# Patient Record
Sex: FEMALE | Race: WHITE | NOT HISPANIC OR LATINO | Employment: OTHER | URBAN - METROPOLITAN AREA
[De-identification: names, ages, dates, MRNs, and addresses within clinical notes are randomized per-mention and may not be internally consistent; named-entity substitution may affect disease eponyms.]

---

## 2019-06-16 ENCOUNTER — APPOINTMENT (EMERGENCY)
Dept: RADIOLOGY | Facility: HOSPITAL | Age: 69
End: 2019-06-16
Payer: COMMERCIAL

## 2019-06-16 ENCOUNTER — HOSPITAL ENCOUNTER (EMERGENCY)
Facility: HOSPITAL | Age: 69
Discharge: HOME | End: 2019-06-16
Attending: EMERGENCY MEDICINE | Admitting: EMERGENCY MEDICINE
Payer: COMMERCIAL

## 2019-06-16 VITALS
WEIGHT: 151.68 LBS | HEART RATE: 60 BPM | SYSTOLIC BLOOD PRESSURE: 138 MMHG | DIASTOLIC BLOOD PRESSURE: 65 MMHG | RESPIRATION RATE: 14 BRPM | BODY MASS INDEX: 26.87 KG/M2 | TEMPERATURE: 98 F | OXYGEN SATURATION: 97 %

## 2019-06-16 DIAGNOSIS — S62.109A WRIST FRACTURE: Primary | ICD-10-CM

## 2019-06-16 PROCEDURE — 99284 EMERGENCY DEPT VISIT MOD MDM: CPT

## 2019-06-16 PROCEDURE — 96374 THER/PROPH/DIAG INJ IV PUSH: CPT

## 2019-06-16 PROCEDURE — 99284 EMERGENCY DEPT VISIT MOD MDM: CPT | Performed by: EMERGENCY MEDICINE

## 2019-06-16 PROCEDURE — 73100 X-RAY EXAM OF WRIST: CPT

## 2019-06-16 PROCEDURE — 73110 X-RAY EXAM OF WRIST: CPT

## 2019-06-16 PROCEDURE — 73090 X-RAY EXAM OF FOREARM: CPT

## 2019-06-16 PROCEDURE — 96375 TX/PRO/DX INJ NEW DRUG ADDON: CPT

## 2019-06-16 RX ORDER — LATANOPROST 50 UG/ML
1 SOLUTION/ DROPS OPHTHALMIC
COMMUNITY

## 2019-06-16 RX ORDER — MORPHINE SULFATE 4 MG/ML
4 INJECTION, SOLUTION INTRAMUSCULAR; INTRAVENOUS ONCE
Status: COMPLETED | OUTPATIENT
Start: 2019-06-16 | End: 2019-06-16

## 2019-06-16 RX ORDER — FENTANYL CITRATE 50 UG/ML
50 INJECTION, SOLUTION INTRAMUSCULAR; INTRAVENOUS ONCE
Status: COMPLETED | OUTPATIENT
Start: 2019-06-16 | End: 2019-06-16

## 2019-06-16 RX ORDER — OLMESARTAN MEDOXOMIL 20 MG/1
1 TABLET ORAL DAILY
COMMUNITY
Start: 2013-07-05 | End: 2019-06-26

## 2019-06-16 RX ORDER — OXYCODONE HYDROCHLORIDE AND ACETAMINOPHEN 5; 325 MG/1; MG/1
1 TABLET ORAL EVERY 4 HOURS PRN
Qty: 13 TABLET | Refills: 0 | Status: SHIPPED | OUTPATIENT
Start: 2019-06-16 | End: 2019-06-16 | Stop reason: SDUPTHER

## 2019-06-16 RX ORDER — OXYCODONE HYDROCHLORIDE AND ACETAMINOPHEN 5; 325 MG/1; MG/1
1 TABLET ORAL EVERY 4 HOURS PRN
Qty: 13 TABLET | Refills: 0 | Status: SHIPPED | OUTPATIENT
Start: 2019-06-16 | End: 2019-06-27 | Stop reason: HOSPADM

## 2019-06-16 RX ORDER — PROPOFOL 10 MG/ML
1.5 INJECTION, EMULSION INTRAVENOUS ONCE
Status: COMPLETED | OUTPATIENT
Start: 2019-06-16 | End: 2019-06-16

## 2019-06-16 RX ORDER — BRIMONIDINE TARTRATE, TIMOLOL MALEATE 2; 5 MG/ML; MG/ML
1 SOLUTION/ DROPS OPHTHALMIC EVERY 12 HOURS SCHEDULED
COMMUNITY

## 2019-06-16 RX ORDER — CYCLOPENTOLATE HYDROCHLORIDE 5 MG/ML
1 SOLUTION/ DROPS OPHTHALMIC 2 TIMES DAILY
COMMUNITY

## 2019-06-16 RX ORDER — ONDANSETRON 2 MG/ML
4 INJECTION INTRAMUSCULAR; INTRAVENOUS ONCE
Status: COMPLETED | OUTPATIENT
Start: 2019-06-16 | End: 2019-06-16

## 2019-06-16 RX ORDER — FLUOXETINE HYDROCHLORIDE 40 MG/1
40 CAPSULE ORAL DAILY
COMMUNITY
Start: 2019-01-02

## 2019-06-16 RX ORDER — CYCLOSPORINE 0.5 MG/ML
1 EMULSION OPHTHALMIC EVERY 12 HOURS
COMMUNITY

## 2019-06-16 RX ADMIN — MORPHINE SULFATE 4 MG: 4 INJECTION INTRAVENOUS at 16:43

## 2019-06-16 RX ADMIN — FENTANYL CITRATE 50 MCG: 50 INJECTION, SOLUTION INTRAMUSCULAR; INTRAVENOUS at 18:06

## 2019-06-16 RX ADMIN — PROPOFOL 60 MG: 10 INJECTION, EMULSION INTRAVENOUS at 17:40

## 2019-06-16 RX ADMIN — ONDANSETRON 4 MG: 2 INJECTION INTRAMUSCULAR; INTRAVENOUS at 16:39

## 2019-06-16 NOTE — ED PROVIDER NOTES
History  Chief Complaint   Patient presents with   Jameel Castro     states fell off chair about 2 5 feet injured right wrist     Wrist Injury - Major     27-year-old female presents for evaluation of right wrist injury  Patient states she was to have feet off the ground standing on a chair when she stepped down she lost her footing and landed onto her right wrist   She denies striking her head, loss of consciousness  Patient states she has sudden onset of sharp severe pain in her right wrist where there is a noted deformity  She states the pain is severe, worse with attempting to use her hand or move her wrist   She states nothing makes it feel better  No history of similar symptoms  Patient denies other traumatic injuries, headache, focal neuro deficits or weakness, nausea vomiting, blood thinner use  Patient has tried nothing for alleviation of symptoms  History provided by:  Patient      Prior to Admission Medications   Prescriptions Last Dose Informant Patient Reported? Taking? FLUoxetine (PROzac) 40 MG capsule   Yes Yes   Sig: Take 40 mg by mouth daily   brimonidine-timolol (COMBIGAN) 0 2-0 5 %   Yes Yes   Sig: Administer 1 drop to both eyes every 12 (twelve) hours   cycloSPORINE (RESTASIS) 0 05 % ophthalmic emulsion   Yes Yes   Sig: Administer 1 drop to both eyes every 12 (twelve) hours   cyclopentolate (CYCLOGYL) 0 5 % ophthalmic solution   Yes Yes   Sig: Administer 1 drop to both eyes 2 (two) times a day   latanoprost (XALATAN) 0 005 % ophthalmic solution   Yes Yes   Sig: Administer 1 drop to both eyes daily at bedtime   olmesartan (BENICAR) 20 mg tablet   Yes Yes   Sig: Take 1 tablet by mouth daily      Facility-Administered Medications: None       History reviewed  No pertinent past medical history  Past Surgical History:   Procedure Laterality Date    APPENDECTOMY      EYE SURGERY      SHOULDER SURGERY      TUBAL LIGATION         History reviewed  No pertinent family history    I have reviewed and agree with the history as documented  Social History     Tobacco Use    Smoking status: Never Smoker    Smokeless tobacco: Never Used   Substance Use Topics    Alcohol use: Not Currently    Drug use: Never        Review of Systems   Constitutional: Negative for activity change, appetite change, fatigue and fever  HENT: Negative for congestion, dental problem, ear pain, rhinorrhea and sore throat  Eyes: Negative for pain and redness  Respiratory: Negative for chest tightness, shortness of breath and wheezing  Cardiovascular: Negative for chest pain and palpitations  Gastrointestinal: Negative for abdominal pain, blood in stool, constipation, diarrhea, nausea and vomiting  Endocrine: Negative for cold intolerance and heat intolerance  Genitourinary: Negative for dysuria, frequency and hematuria  Musculoskeletal: Positive for arthralgias  Negative for myalgias, neck pain and neck stiffness  Skin: Negative for color change, pallor and rash  Neurological: Negative for dizziness, syncope, facial asymmetry, speech difficulty, weakness, light-headedness, numbness and headaches  Hematological: Does not bruise/bleed easily  Psychiatric/Behavioral: Negative for agitation, hallucinations and suicidal ideas  Physical Exam  Physical Exam   Constitutional: She appears well-developed and well-nourished  HENT:   Normocephalic/atraumatic  There is no facial bone tenderness palpation  No facial bone tenderness  No gloria sign, no raccoon eyes, no hemotympanum  Nose is atraumatic  No evidence of epistaxis  Eyes:   Patient has painless full range of motion in both her eyes  Normal visual fields  No hyphema noted  Neck: No tracheal deviation present  Patient is nontender palpation over her cervical, thoracic, lumbar spines  There is no step-offs, no deformities  Cardiovascular:   No JVD noted  Heart sounds are normal  Regular rate rate and rhythm   Symmetric strong distal pulses  Pulmonary/Chest:   Chest wall is stable and nontender palpation  There is no crepitus noted  Patient has symmetric chest wall expansion  No flail segment noted clear and equal breath sounds bilateral    Abdominal:   No external signs of trauma noted on the abdomen/pelvis  Patient is nontender palpation of the abdomen  There is no rebound, guarding, CVA tenderness  Abdomen is nondistended  Pelvis stable, nttp  Musculoskeletal:   Right upper extremity has full range of motion without pain except for R wrist  There is no tenderness palpation noted except over R wrist deformity  Patient has obviousd eformity of R wrist Patient is neurovascularly intact in this extremity  Left upper extremity has full range of motion without pain  There is no tenderness palpation noted  Patient has no external signs of trauma  Patient is neurovascularly intact in this extremity  Right lower extremity has full range of motion without pain  There is no tenderness palpation noted  Patient has no external signs of trauma  Patient is neurovascularly intact in this extremity  Left Lower  extremity has full range of motion without pain  There is no tenderness palpation noted  Patient has no external signs of trauma  Patient is neurovascularly intact in this extremity  Neurological:   Alert and oriented ×3  Normal mental status exam  Normal cranial nerves II through XII  Normal sensation and strength throughout  Normal cerebellar exam  GCS 15  Skin:   No external signs of trauma  Psychiatric: She has a normal mood and affect  Her behavior is normal  Judgment normal    Nursing note and vitals reviewed        Vital Signs  ED Triage Vitals   Temperature Pulse Respirations Blood Pressure SpO2   06/16/19 1619 06/16/19 1619 06/16/19 1619 06/16/19 1621 06/16/19 1619   98 °F (36 7 °C) 60 20 138/64 96 %      Temp Source Heart Rate Source Patient Position - Orthostatic VS BP Location FiO2 (%)   06/16/19 1619 06/16/19 1619 06/16/19 1619 06/16/19 1619 --   Oral Monitor Sitting Left arm       Pain Score       06/16/19 1619       Worst Possible Pain           Vitals:    06/16/19 1801 06/16/19 1808 06/16/19 1830 06/16/19 1900   BP: 139/66 149/67 116/57 138/65   Pulse: 61 58 58 60   Patient Position - Orthostatic VS: Sitting            Visual Acuity      ED Medications  Medications   ondansetron (ZOFRAN) injection 4 mg (4 mg Intravenous Given 6/16/19 1639)   morphine (PF) 4 mg/mL injection 4 mg (4 mg Intravenous Given 6/16/19 1643)   propofol (DIPRIVAN) 200 MG/20ML bolus injection 103 mg (60 mg Intravenous Given 6/16/19 1740)   fentanyl citrate (PF) 100 MCG/2ML 50 mcg (50 mcg Intravenous Given 6/16/19 1806)       Diagnostic Studies  Results Reviewed     None                 XR wrist 2 vw right   Final Result by Mikal Olivia MD (06/17 0008)      Status post reduction of a distal radial fracture without evidence of complication  Ulnar styloid process fracture is redemonstrated  Workstation performed: [id number]         XR forearm 2 views RIGHT   ED Interpretation by Prisca Musa MD (06/16 8841)   Primary reviewed: improved alignment s/p reduction      Final Result by Luisana Bhatti MD (06/17 2345)      Dorsally displaced fracture of the distal right radius  Workstation performed: [id number]         XR wrist 3+ views RIGHT   ED Interpretation by Prisca Musa MD (06/16 2334)   Primary revieweD: impacted displaced radius fracture      Final Result by Luisana Bhatti MD (06/17 4956)      Dorsally displaced and angulated fracture of the distal right radius        Workstation performed: [id number]                    Procedures  Orthopedic Injury  Date/Time: 6/16/2019 6:00 PM  Performed by: Prisca Musa MD  Authorized by: Prisca Musa MD     Patient Location:  ED  Other Assisting Provider: Yes (comment)    Verbal consent obtained?: Yes    Written consent obtained?: Yes    Risks and benefits: Risks, benefits and alternatives were discussed    Consent given by:  Patient  Patient states understanding of procedure being performed: Yes    Patient's understanding of procedure matches consent: Yes    Procedure consent matches procedure scheduled: Yes    Relevant documents present and verified: Yes    Test results available and properly labeled: Yes    Site marked: Yes    Radiology Images displayed and confirmed  If images not available, report reviewed: Yes    Required items: Required blood products, implants, devices and special equipment available    Patient identity confirmed:  Verbally with patient and arm band  Time out: Immediately prior to the procedure a time out was called    Injury location:  Wrist  Location details:  Right wrist  Injury type:  Fracture  Neurovascular status: Neurovascularly intact    Sedation type: Moderate (conscious) sedation (See separate Procedural Sedation form)  Manipulation performed?: Yes    Skin traction used?: Yes    Reduction successful?: Yes    Confirmation: Reduction confirmed by x-ray    Immobilization:  Splint  Splint type:  Sugar tong  Supplies used:  Cotton padding and Ortho-Glass  Neurovascular status: Neurovascularly intact    Patient tolerance:  Patient tolerated the procedure well with no immediate complications  Procedural Sedation  Date/Time: 6/16/2019 6:00 PM  Performed by: Ty Multani MD  Authorized by: Ty Multani MD     Consent:     Consent obtained:  Verbal and written    Consent given by:  Patient    Risks discussed:   Allergic reaction, dysrhythmia, inadequate sedation, nausea, vomiting, respiratory compromise necessitating ventilatory assistance and intubation, prolonged sedation necessitating reversal and prolonged hypoxia resulting in organ damage    Alternatives discussed:  Analgesia without sedation, anxiolysis and regional anesthesia  [place] protocol:     Procedure explained and questions answered to patient or proxy's satisfaction: yes      Relevant documents present and verified: yes      Test results available and properly labeled: yes      Radiology Images displayed and confirmed    If images not available, report reviewed: yes      Required blood products, implants, devices, and special equipment available: yes      Site/side marked: yes      Immediately prior to procedure a time out was called: yes      Patient identity confirmation method:  Verbally with patient and arm band  Indications:     Sedation purpose:  Fracture reduction    Procedure necessitating sedation performed by:  Physician performing sedation    Intended level of sedation:  Moderate (conscious sedation)  Pre-sedation assessment:     NPO status caution: unable to specify NPO status      ASA classification: class 2 - patient with mild systemic disease      Neck mobility: normal      Mouth opening:  3 or more finger widths    Mallampati score:  II - soft palate, uvula, fauces visible    Pre-sedation assessments completed and reviewed: airway patency, cardiovascular function, hydration status, mental status, nausea/vomiting, pain level, respiratory function and temperature    Immediate pre-procedure details:     Reassessment: Patient reassessed immediately prior to procedure      Reviewed: vital signs      Verified: bag valve mask available, emergency equipment available, intubation equipment available, IV patency confirmed, oxygen available, reversal medications available and suction available    Procedure details (see MAR for exact dosages):     Preoxygenation:  Room air    Sedation:  Propofol    Intra-procedure monitoring:  Blood pressure monitoring, cardiac monitor, continuous pulse oximetry, continuous capnometry, frequent LOC assessments and frequent vital sign checks    Intra-procedure events: none    Post-procedure details:     Attendance: Constant attendance by certified staff until patient recovered      Recovery: Patient returned to pre-procedure baseline      Post-sedation assessments completed and reviewed: airway patency, cardiovascular function, hydration status, mental status, nausea/vomiting, pain level, respiratory function and temperature      Patient is stable for discharge or admission: yes      Patient tolerance: Tolerated well, no immediate complications      Conscious Sedation Assessment      Classification Score   ASA Scale Assessment  1-Healthy patient, no disease outside surgical process filed at 06/16/2019 6932           ED Course                               MDM  Number of Diagnoses or Management Options  Diagnosis management comments: Mechanical fall with obvious deformity of the right wrist-will do p r n  Pain medications, x-ray to rule out fracture/dislocation  Reduction of wrist was successful  Pt was searched in [facility] aware  Will dc to home with ortho f/u , pain meds      Disposition  Final diagnoses:   Wrist fracture     Time reflects when diagnosis was documented in both MDM as applicable and the Disposition within this note     Time User Action Codes Description Comment    6/16/2019  6:27 PM Sebastien Corley Add [S62 109A] Wrist fracture       ED Disposition     ED Disposition Condition Date/Time Comment    Discharge Stable Sun Jun 16, 2019  6:03 PM Allison Eid discharge to home/self care              Follow-up Information     Follow up With Specialties Details Why Contact Info [place] [id number] [facility] Orthopedic Surgery Schedule an appointment as soon as possible for a visit   [address] [facility] [address] [phone]  [address] [place] [address], [address] [place] [address],  [address] [place] [address], [phone]          Discharge Medication List as of 6/16/2019  6:33 PM      CONTINUE these medications which have CHANGED    Details   oxyCODONE-acetaminophen (PERCOCET) 5-325 mg per tablet Take 1 tablet by mouth every 4 (four) hours as needed for moderate pain for up to 10 daysMax Daily Amount: 6 tablets, Starting Sun 6/16/2019, Until Wed 6/26/2019, Print         CONTINUE these medications which have NOT CHANGED    Details   brimonidine-timolol (COMBIGAN) 0 2-0 5 % Administer 1 drop to both eyes every 12 (twelve) hours, Historical Med      cyclopentolate (CYCLOGYL) 0 5 % ophthalmic solution Administer 1 drop to both eyes 2 (two) times a day, Historical Med      cycloSPORINE (RESTASIS) 0 05 % ophthalmic emulsion Administer 1 drop to both eyes every 12 (twelve) hours, Historical Med      FLUoxetine (PROzac) 40 MG capsule Take 40 mg by mouth daily, Starting Wed 1/2/2019, Historical Med      latanoprost (XALATAN) 0 005 % ophthalmic solution Administer 1 drop to both eyes daily at bedtime, Historical Med      olmesartan (BENICAR) 20 mg tablet Take 1 tablet by mouth daily, Starting Fri 7/5/2013, Historical Med           No discharge procedures on file      ED Provider  Electronically Signed by           Jamal Mendez MD  06/16/19 15937 Jennyfer Vazquez MD  06/25/19 0085

## 2019-06-17 ENCOUNTER — OFFICE VISIT (OUTPATIENT)
Dept: OBGYN CLINIC | Facility: OTHER | Age: 69
End: 2019-06-17
Payer: COMMERCIAL

## 2019-06-17 VITALS — BODY MASS INDEX: 27.11 KG/M2 | HEIGHT: 63 IN | WEIGHT: 153 LBS

## 2019-06-17 DIAGNOSIS — S62.101A CLOSED FRACTURE OF RIGHT WRIST, INITIAL ENCOUNTER: ICD-10-CM

## 2019-06-17 DIAGNOSIS — S52.601A CLOSED FRACTURE DISTAL RADIUS AND ULNA, RIGHT, INITIAL ENCOUNTER: Primary | ICD-10-CM

## 2019-06-17 DIAGNOSIS — S52.501A CLOSED FRACTURE DISTAL RADIUS AND ULNA, RIGHT, INITIAL ENCOUNTER: Primary | ICD-10-CM

## 2019-06-17 PROCEDURE — 29105 APPLICATION LONG ARM SPLINT: CPT | Performed by: ORTHOPAEDIC SURGERY

## 2019-06-17 PROCEDURE — 99203 OFFICE O/P NEW LOW 30 MIN: CPT | Performed by: ORTHOPAEDIC SURGERY

## 2019-06-21 ENCOUNTER — TELEPHONE (OUTPATIENT)
Dept: OBGYN CLINIC | Facility: HOSPITAL | Age: 69
End: 2019-06-21

## 2019-06-26 ENCOUNTER — OFFICE VISIT (OUTPATIENT)
Dept: OBGYN CLINIC | Facility: HOSPITAL | Age: 69
End: 2019-06-26
Payer: COMMERCIAL

## 2019-06-26 ENCOUNTER — PREP FOR PROCEDURE (OUTPATIENT)
Dept: OBGYN CLINIC | Facility: HOSPITAL | Age: 69
End: 2019-06-26

## 2019-06-26 ENCOUNTER — HOSPITAL ENCOUNTER (OUTPATIENT)
Dept: RADIOLOGY | Facility: HOSPITAL | Age: 69
Discharge: HOME | End: 2019-06-26
Attending: ORTHOPAEDIC SURGERY
Payer: COMMERCIAL

## 2019-06-26 VITALS
HEART RATE: 86 BPM | DIASTOLIC BLOOD PRESSURE: 85 MMHG | HEIGHT: 63 IN | BODY MASS INDEX: 26.82 KG/M2 | WEIGHT: 151.4 LBS | SYSTOLIC BLOOD PRESSURE: 141 MMHG

## 2019-06-26 DIAGNOSIS — S62.101A CLOSED FRACTURE OF RIGHT WRIST, INITIAL ENCOUNTER: Primary | ICD-10-CM

## 2019-06-26 DIAGNOSIS — S52.601A CLOSED FRACTURE DISTAL RADIUS AND ULNA, RIGHT, INITIAL ENCOUNTER: ICD-10-CM

## 2019-06-26 DIAGNOSIS — S62.101A CLOSED FRACTURE OF RIGHT WRIST, INITIAL ENCOUNTER: ICD-10-CM

## 2019-06-26 DIAGNOSIS — S52.501A CLOSED FRACTURE DISTAL RADIUS AND ULNA, RIGHT, INITIAL ENCOUNTER: ICD-10-CM

## 2019-06-26 PROCEDURE — 73110 X-RAY EXAM OF WRIST: CPT

## 2019-06-26 PROCEDURE — 99203 OFFICE O/P NEW LOW 30 MIN: CPT | Performed by: ORTHOPAEDIC SURGERY

## 2019-06-26 RX ORDER — DORZOLAMIDE HCL 20 MG/ML
SOLUTION/ DROPS OPHTHALMIC
COMMUNITY
Start: 2019-06-22

## 2019-06-26 RX ORDER — CLINDAMYCIN PHOSPHATE 900 MG/50ML
900 INJECTION INTRAVENOUS ONCE
Status: CANCELLED | OUTPATIENT
Start: 2019-06-27 | End: 2019-06-26

## 2019-06-27 ENCOUNTER — ANESTHESIA EVENT (OUTPATIENT)
Dept: PERIOP | Facility: HOSPITAL | Age: 69
End: 2019-06-27
Payer: COMMERCIAL

## 2019-06-27 ENCOUNTER — ANESTHESIA (OUTPATIENT)
Dept: PERIOP | Facility: HOSPITAL | Age: 69
End: 2019-06-27
Payer: COMMERCIAL

## 2019-06-27 ENCOUNTER — HOSPITAL ENCOUNTER (OUTPATIENT)
Facility: HOSPITAL | Age: 69
Setting detail: OUTPATIENT SURGERY
Discharge: HOME | End: 2019-06-27
Attending: ORTHOPAEDIC SURGERY | Admitting: ORTHOPAEDIC SURGERY
Payer: COMMERCIAL

## 2019-06-27 VITALS
OXYGEN SATURATION: 94 % | WEIGHT: 149.8 LBS | SYSTOLIC BLOOD PRESSURE: 137 MMHG | RESPIRATION RATE: 20 BRPM | HEIGHT: 63 IN | HEART RATE: 76 BPM | DIASTOLIC BLOOD PRESSURE: 57 MMHG | TEMPERATURE: 97.9 F | BODY MASS INDEX: 26.54 KG/M2

## 2019-06-27 DIAGNOSIS — S52.571A OTHER CLOSED INTRA-ARTICULAR FRACTURE OF DISTAL END OF RIGHT RADIUS, INITIAL ENCOUNTER: Primary | ICD-10-CM

## 2019-06-27 PROBLEM — S52.501A CLOSED FRACTURE OF DISTAL END OF RIGHT RADIUS: Status: ACTIVE | Noted: 2019-06-26

## 2019-06-27 PROCEDURE — C1713 ANCHOR/SCREW BN/BN,TIS/BN: HCPCS | Performed by: ORTHOPAEDIC SURGERY

## 2019-06-27 PROCEDURE — 25609 OPTX DST RD XART FX/EP SEP3+: CPT | Performed by: PHYSICIAN ASSISTANT

## 2019-06-27 PROCEDURE — 93005 ELECTROCARDIOGRAM TRACING: CPT

## 2019-06-27 PROCEDURE — 25609 OPTX DST RD XART FX/EP SEP3+: CPT | Performed by: ORTHOPAEDIC SURGERY

## 2019-06-27 DEVICE — PEG VOLAR 14MM UNTHREADED: Type: IMPLANTABLE DEVICE | Site: WRIST | Status: FUNCTIONAL

## 2019-06-27 DEVICE — PLATE VOLAR FIXED ANGLE 3-HOLE RIGHT: Type: IMPLANTABLE DEVICE | Site: WRIST | Status: FUNCTIONAL

## 2019-06-27 DEVICE — PEG VOLAR THREADED 16MM: Type: IMPLANTABLE DEVICE | Site: WRIST | Status: FUNCTIONAL

## 2019-06-27 DEVICE — SCREW CORTICAL 3.2 X 14MM: Type: IMPLANTABLE DEVICE | Site: WRIST | Status: FUNCTIONAL

## 2019-06-27 DEVICE — K-WIRE 1.6 X 100MM: Type: IMPLANTABLE DEVICE | Site: WRIST | Status: FUNCTIONAL

## 2019-06-27 DEVICE — PEG VOLAR THREADED 18MM: Type: IMPLANTABLE DEVICE | Site: WRIST | Status: FUNCTIONAL

## 2019-06-27 DEVICE — SCREW CORTICAL 3.2 X 13MM: Type: IMPLANTABLE DEVICE | Site: WRIST | Status: FUNCTIONAL

## 2019-06-27 DEVICE — PEG VOLAR THREADED 14MM: Type: IMPLANTABLE DEVICE | Site: WRIST | Status: FUNCTIONAL

## 2019-06-27 DEVICE — SCREW CORTICAL 3.2 X 12MM: Type: IMPLANTABLE DEVICE | Site: WRIST | Status: FUNCTIONAL

## 2019-06-27 DEVICE — PEG VOLAR 16MM UNTHREADED: Type: IMPLANTABLE DEVICE | Site: WRIST | Status: FUNCTIONAL

## 2019-06-27 RX ORDER — ONDANSETRON 2 MG/ML
INJECTION INTRAMUSCULAR; INTRAVENOUS AS NEEDED
Status: DISCONTINUED | OUTPATIENT
Start: 2019-06-27 | End: 2019-06-27 | Stop reason: SURG

## 2019-06-27 RX ORDER — CLINDAMYCIN PHOSPHATE 900 MG/50ML
900 INJECTION INTRAVENOUS ONCE
Status: COMPLETED | OUTPATIENT
Start: 2019-06-27 | End: 2019-06-27

## 2019-06-27 RX ORDER — FENTANYL CITRATE 50 UG/ML
INJECTION, SOLUTION INTRAMUSCULAR; INTRAVENOUS
Status: COMPLETED | OUTPATIENT
Start: 2019-06-27 | End: 2019-06-27

## 2019-06-27 RX ORDER — PROPOFOL 10 MG/ML
INJECTION, EMULSION INTRAVENOUS CONTINUOUS PRN
Status: DISCONTINUED | OUTPATIENT
Start: 2019-06-27 | End: 2019-06-27 | Stop reason: SURG

## 2019-06-27 RX ORDER — ONDANSETRON 2 MG/ML
4 INJECTION INTRAMUSCULAR; INTRAVENOUS ONCE
Status: DISCONTINUED | OUTPATIENT
Start: 2019-06-27 | End: 2019-06-27 | Stop reason: HOSPADM

## 2019-06-27 RX ORDER — EPHEDRINE SULFATE 50 MG/ML
INJECTION INTRAVENOUS AS NEEDED
Status: DISCONTINUED | OUTPATIENT
Start: 2019-06-27 | End: 2019-06-27 | Stop reason: SURG

## 2019-06-27 RX ORDER — MAGNESIUM HYDROXIDE 1200 MG/15ML
LIQUID ORAL AS NEEDED
Status: DISCONTINUED | OUTPATIENT
Start: 2019-06-27 | End: 2019-06-27 | Stop reason: HOSPADM

## 2019-06-27 RX ORDER — HYDROCODONE BITARTRATE AND ACETAMINOPHEN 5; 325 MG/1; MG/1
1 TABLET ORAL EVERY 6 HOURS PRN
Status: DISCONTINUED | OUTPATIENT
Start: 2019-06-27 | End: 2019-06-27 | Stop reason: HOSPADM

## 2019-06-27 RX ORDER — SODIUM CHLORIDE, SODIUM LACTATE, POTASSIUM CHLORIDE, CALCIUM CHLORIDE 600; 310; 30; 20 MG/100ML; MG/100ML; MG/100ML; MG/100ML
75 INJECTION, SOLUTION INTRAVENOUS CONTINUOUS
Status: DISCONTINUED | OUTPATIENT
Start: 2019-06-27 | End: 2019-06-27 | Stop reason: HOSPADM

## 2019-06-27 RX ORDER — NAPROXEN SODIUM 220 MG
220 TABLET ORAL 2 TIMES DAILY WITH MEALS
Qty: 10 TABLET | Refills: 0 | Status: SHIPPED | OUTPATIENT
Start: 2019-06-27 | End: 2019-10-18

## 2019-06-27 RX ORDER — HYDROCODONE BITARTRATE AND ACETAMINOPHEN 5; 325 MG/1; MG/1
1 TABLET ORAL EVERY 6 HOURS PRN
Qty: 20 TABLET | Refills: 0 | Status: SHIPPED | OUTPATIENT
Start: 2019-06-27

## 2019-06-27 RX ORDER — LIDOCAINE HYDROCHLORIDE 10 MG/ML
INJECTION, SOLUTION INFILTRATION; PERINEURAL
Status: COMPLETED | OUTPATIENT
Start: 2019-06-27 | End: 2019-06-27

## 2019-06-27 RX ORDER — ROPIVACAINE HYDROCHLORIDE 5 MG/ML
INJECTION, SOLUTION EPIDURAL; INFILTRATION; PERINEURAL
Status: COMPLETED | OUTPATIENT
Start: 2019-06-27 | End: 2019-06-27

## 2019-06-27 RX ORDER — FENTANYL CITRATE/PF 50 MCG/ML
25 SYRINGE (ML) INJECTION
Status: DISCONTINUED | OUTPATIENT
Start: 2019-06-27 | End: 2019-06-27 | Stop reason: HOSPADM

## 2019-06-27 RX ORDER — PROPOFOL 10 MG/ML
INJECTION, EMULSION INTRAVENOUS AS NEEDED
Status: DISCONTINUED | OUTPATIENT
Start: 2019-06-27 | End: 2019-06-27 | Stop reason: SURG

## 2019-06-27 RX ORDER — SENNOSIDES 8.6 MG
650 CAPSULE ORAL EVERY 8 HOURS
Qty: 15 TABLET | Refills: 0 | Status: SHIPPED | OUTPATIENT
Start: 2019-06-27 | End: 2019-07-02

## 2019-06-27 RX ORDER — MIDAZOLAM HYDROCHLORIDE 1 MG/ML
INJECTION INTRAMUSCULAR; INTRAVENOUS
Status: COMPLETED | OUTPATIENT
Start: 2019-06-27 | End: 2019-06-27

## 2019-06-27 RX ORDER — ACETAMINOPHEN 325 MG/1
650 TABLET ORAL ONCE
Status: DISCONTINUED | OUTPATIENT
Start: 2019-06-27 | End: 2019-06-27 | Stop reason: HOSPADM

## 2019-06-27 RX ADMIN — PROPOFOL 80 MCG/KG/MIN: 10 INJECTION, EMULSION INTRAVENOUS at 13:15

## 2019-06-27 RX ADMIN — FENTANYL CITRATE 100 MCG: 50 INJECTION, SOLUTION INTRAMUSCULAR; INTRAVENOUS at 13:03

## 2019-06-27 RX ADMIN — ROPIVACAINE HYDROCHLORIDE 30 ML: 5 INJECTION, SOLUTION EPIDURAL; INFILTRATION; PERINEURAL at 13:03

## 2019-06-27 RX ADMIN — MIDAZOLAM 2 MG: 1 INJECTION INTRAMUSCULAR; INTRAVENOUS at 13:03

## 2019-06-27 RX ADMIN — EPHEDRINE SULFATE 10 MG: 50 INJECTION, SOLUTION INTRAVENOUS at 13:35

## 2019-06-27 RX ADMIN — SODIUM CHLORIDE, SODIUM LACTATE, POTASSIUM CHLORIDE, AND CALCIUM CHLORIDE 75 ML/HR: .6; .31; .03; .02 INJECTION, SOLUTION INTRAVENOUS at 12:30

## 2019-06-27 RX ADMIN — CLINDAMYCIN IN 5 PERCENT DEXTROSE 900 MG: 18 INJECTION, SOLUTION INTRAVENOUS at 13:15

## 2019-06-27 RX ADMIN — ONDANSETRON 4 MG: 2 INJECTION INTRAMUSCULAR; INTRAVENOUS at 14:13

## 2019-06-27 RX ADMIN — PROPOFOL 100 MG: 10 INJECTION, EMULSION INTRAVENOUS at 13:30

## 2019-06-27 RX ADMIN — LIDOCAINE HYDROCHLORIDE 3 ML: 10 INJECTION, SOLUTION INFILTRATION; PERINEURAL at 13:03

## 2019-06-28 LAB
ATRIAL RATE: 65 BPM
P AXIS: 25 DEGREES
PR INTERVAL: 136 MS
QRS AXIS: -34 DEGREES
QRSD INTERVAL: 86 MS
QT INTERVAL: 426 MS
QTC INTERVAL: 443 MS
T WAVE AXIS: 27 DEGREES
VENTRICULAR RATE: 65 BPM

## 2019-06-28 PROCEDURE — 93010 ELECTROCARDIOGRAM REPORT: CPT | Performed by: INTERNAL MEDICINE

## 2019-07-12 ENCOUNTER — OFFICE VISIT (OUTPATIENT)
Dept: OBGYN CLINIC | Facility: HOSPITAL | Age: 69
End: 2019-07-12

## 2019-07-12 ENCOUNTER — HOSPITAL ENCOUNTER (OUTPATIENT)
Dept: RADIOLOGY | Facility: HOSPITAL | Age: 69
Discharge: HOME | End: 2019-07-12
Attending: ORTHOPAEDIC SURGERY
Payer: COMMERCIAL

## 2019-07-12 ENCOUNTER — OFFICE VISIT (OUTPATIENT)
Dept: OCCUPATIONAL THERAPY | Facility: HOSPITAL | Age: 69
End: 2019-07-12
Payer: COMMERCIAL

## 2019-07-12 VITALS
BODY MASS INDEX: 26.79 KG/M2 | WEIGHT: 151.2 LBS | HEIGHT: 63 IN | HEART RATE: 78 BPM | SYSTOLIC BLOOD PRESSURE: 131 MMHG | DIASTOLIC BLOOD PRESSURE: 70 MMHG

## 2019-07-12 DIAGNOSIS — R52 PAIN: ICD-10-CM

## 2019-07-12 DIAGNOSIS — Z87.81 S/P ORIF (OPEN REDUCTION INTERNAL FIXATION) FRACTURE: ICD-10-CM

## 2019-07-12 DIAGNOSIS — Z98.890 S/P ORIF (OPEN REDUCTION INTERNAL FIXATION) FRACTURE: Primary | ICD-10-CM

## 2019-07-12 DIAGNOSIS — Z87.81 S/P ORIF (OPEN REDUCTION INTERNAL FIXATION) FRACTURE: Primary | ICD-10-CM

## 2019-07-12 DIAGNOSIS — Z98.890 S/P ORIF (OPEN REDUCTION INTERNAL FIXATION) FRACTURE: ICD-10-CM

## 2019-07-12 PROCEDURE — 99024 POSTOP FOLLOW-UP VISIT: CPT | Performed by: ORTHOPAEDIC SURGERY

## 2019-07-12 PROCEDURE — 73110 X-RAY EXAM OF WRIST: CPT

## 2019-07-12 PROCEDURE — 97760 ORTHOTIC MGMT&TRAING 1ST ENC: CPT | Performed by: OCCUPATIONAL THERAPIST

## 2019-07-12 NOTE — PROGRESS NOTES
Assessment:   S/P R distal radius ORIF 06/27/19    Plan:   Therapy to work on ROM  Volar wrist splint that she can wean out of as motion is obtained  Refrain from heavy lifting but light activities (ie fork/knife/spoon, writing, typing) are OK    Follow Up:  6  week(s)    To Do Next Visit:  X-rays of the  right  wrist      CHIEF COMPLAINT:  Chief Complaint   Patient presents with    Right Wrist - Post-op         SUBJECTIVE:  Allison Eid is a 80 y o  female who presents for follow up S/P R distal radius ORIF 06/27/19  Patient states overall she is doing well  Expected stiffness and soreness  No n/t  PHYSICAL EXAMINATION:  Vital signs: /70   Pulse 78   Ht 5' 3" (1 6 m)   Wt 68 6 kg (151 lb 3 2 oz)   BMI 26 78 kg/m²   General: well developed and well nourished, alert, oriented times 3 and appears comfortable  Psychiatric: Normal    MUSCULOSKELETAL EXAMINATION:  Incision: Clean, dry, intact  Range of Motion: As expected, can make a full fist  Neurovascular status: Neuro intact, good cap refill  Activity Restrictions: Restrictions: Avoid heavy lifting   Done today: Suture       STUDIES REVIEWED:  Images were reviewd in PACS: Xrays today show a well-aligned distal radius fracture s/p ORIF with no evidence of hardware malfunction         PROCEDURES PERFORMED:  Procedures  No Procedures performed today

## 2019-07-12 NOTE — PROGRESS NOTES
Orthosis    Diagnosis:   1  S/P ORIF (open reduction internal fixation) fracture  Ambulatory referral to PT/OT hand therapy     Indication: Fracture    Location: Right  wrist  Supplies: Custom Fit Orthotic  Orthosis type: Wrist splint  Wearing Schedule: Remove for hygiene only and Remove for HEP  Describe Position: function      Precautions: Fracture and Universal (skin contact/breakdown)    Patient or Caregiver expresses understanding of wearing Schedule and Precautions? Yes  Patient or Caregiver able to don/doff orthotic independently? Yes    Written orders provided to patient?  Yes  Orders Obtained: Written  Orders Obtained from: Marisol Hi    Return for evaluation and treatment Yes

## 2019-07-17 ENCOUNTER — EVALUATION (OUTPATIENT)
Dept: PHYSICAL THERAPY | Facility: MEDICAL CENTER | Age: 69
End: 2019-07-17
Payer: COMMERCIAL

## 2019-07-17 DIAGNOSIS — S52.591D OTHER CLOSED FRACTURE OF DISTAL END OF RIGHT RADIUS WITH ROUTINE HEALING, SUBSEQUENT ENCOUNTER: Primary | ICD-10-CM

## 2019-07-17 DIAGNOSIS — Z98.890 S/P ORIF (OPEN REDUCTION INTERNAL FIXATION) FRACTURE: ICD-10-CM

## 2019-07-17 DIAGNOSIS — Z87.81 S/P ORIF (OPEN REDUCTION INTERNAL FIXATION) FRACTURE: ICD-10-CM

## 2019-07-17 PROCEDURE — G8984 CARRY CURRENT STATUS: HCPCS

## 2019-07-17 PROCEDURE — 97161 PT EVAL LOW COMPLEX 20 MIN: CPT

## 2019-07-17 PROCEDURE — G8985 CARRY GOAL STATUS: HCPCS

## 2019-07-17 NOTE — PROGRESS NOTES
PT Evaluation     Today's date: 2019  Patient name: Siddhartha Cui  : 1950  MRN: [id number]  Referring provider: Hollie Mccarthy MD  Dx:   Encounter Diagnosis     ICD-10-CM    1  Other closed fracture of distal end of right radius with routine healing, subsequent encounter S52 095I                   Assessment  Assessment details: Pt is a 80year old RHD female who presents to PT following R DRF s/p ORIF  She presents with good healing of her incision, no tenderness with light palpation, moderate edema to distal wrist, and decreased AROM of wrist and digits  She should benefit from skilled PT to help reduce pain and edema, increase ROM, restore strength when appropriate, and improve overall functioning   Thank you kindly for your referral    Impairments: abnormal or restricted ROM, activity intolerance, impaired physical strength and pain with function  Understanding of Dx/Px/POC: good   Prognosis: good    Goals  STG (2-3 weeks)  1: Reduce pain 2-3 grades on VAS  2: Increase AROM 10-15 degrees or WFL  3: full comp flexion of digits  3: Pt independent in [facility]    LTG (6-8 weeks)  1: Improve FOTO 20 pts  2: PROM WNL  3: wrist strength 4 to 4+/5  4:  strength 25 lbs or higher  5: pt able to perform light ADL's without difficulty      Plan  Plan details: Initiate PT as per POC  Patient would benefit from: skilled physical therapy  Planned modality interventions: cryotherapy and thermotherapy: hydrocollator packs  Planned therapy interventions: joint mobilization, manual therapy, massage, neuromuscular re-education, strengthening, stretching, therapeutic activities, therapeutic exercise, home exercise program, functional ROM exercises, flexibility, fine motor coordination training and graded exercise  Frequency: 2x week  Duration in visits: 16  Duration in weeks: 8  Plan of Care beginning date: 2019  Plan of Care expiration date: 2019  Treatment plan discussed with: patient        Subjective Evaluation    History of Present Illness  Date of onset: 2019  Date of surgery: 2019  Mechanism of injury: surgery and trauma  Mechanism of injury: Hanging a picture, stepped off chair and lost balance and fell  Went to ER- x-ray  ORIF 11 days later  removeable splint last week  Denies parasthesias                Not a recurrent problem   Quality of life: good    Pain  Current pain ratin  At best pain ratin  At worst pain ratin  Quality: dull ache  Relieving factors: heat and ice  Progression: improved    Social Support  Lives with: spouse    Employment status: not working (retired)  Hand dominance: right      Diagnostic Tests  X-ray: normal  Patient Goals  Patient goals for therapy: decreased edema, decreased pain, increased motion, increased strength and independence with ADLs/IADLs          Objective     Observations     Right Wrist/Hand   Positive for edema       Additional Observation Details  No tenderness, mild subdermal tension to retinaculum no fibrotic   Incision closed, NEI    Active Range of Motion     Left Wrist   Wrist flexion: 60 degrees   Wrist extension: 60 degrees   Radial deviation: 28 degrees   Ulnar deviation: 42 degrees     Right Wrist   Wrist flexion: 30 degrees   Wrist extension: 15 degrees   Radial deviation: 10 degrees   Ulnar deviation: 18 degrees     Right Thumb   Opposition: Full opp to base of SF    Additional Active Range of Motion Details  Sup/pron R 30/60  ;   L 90/85  Pt able to form full comp fist- mild dorsal tension to extrinsic extensors    Swelling     Left Wrist/Hand   Circumference MCP: 19 cm  Circumference wrist: 16 3 cm    Right Wrist/Hand   Circumference MCP: 19 5 cm  Circumference wrist: 18 cm             Precautions: DOI 19  DOS: 19      Manual              STM, scar massage             AAROM wrist, FA, digits                                                        Exercise Diary              Towel bunches Ball roll for wrist             Wrist AROM             Grooved pegs                                                                                                                                                                                                   HEP: digit, wrist, FA AROM, towel bunches                              Modalities              MH              CP post TE

## 2019-07-19 ENCOUNTER — OFFICE VISIT (OUTPATIENT)
Dept: PHYSICAL THERAPY | Facility: MEDICAL CENTER | Age: 69
End: 2019-07-19
Payer: COMMERCIAL

## 2019-07-19 DIAGNOSIS — Z98.890 S/P ORIF (OPEN REDUCTION INTERNAL FIXATION) FRACTURE: ICD-10-CM

## 2019-07-19 DIAGNOSIS — S52.591D OTHER CLOSED FRACTURE OF DISTAL END OF RIGHT RADIUS WITH ROUTINE HEALING, SUBSEQUENT ENCOUNTER: Primary | ICD-10-CM

## 2019-07-19 DIAGNOSIS — Z87.81 S/P ORIF (OPEN REDUCTION INTERNAL FIXATION) FRACTURE: ICD-10-CM

## 2019-07-19 PROCEDURE — 97110 THERAPEUTIC EXERCISES: CPT

## 2019-07-19 PROCEDURE — 97140 MANUAL THERAPY 1/> REGIONS: CPT

## 2019-07-19 NOTE — PROGRESS NOTES
Daily Note     Today's date: 2019  Patient name: Quentin Heimlich  : 1950  MRN: [id number]  Referring provider: Mohamud Encinas MD  Dx:   Encounter Diagnosis     ICD-10-CM    1  Other closed fracture of distal end of right radius with routine healing, subsequent encounter S52 591D    2  S/P ORIF (open reduction internal fixation) fracture Z96 7     Z87 81                   Subjective: Pt reports she has immediate tightness after doing therapy exercises  Objective: See treatment diary below      Assessment: Tolerated treatment well  Patient exhibited good technique with therapeutic exercises and would benefit from continued PT Will begin PROM next week  Initiated TE's, pt tolerated well  Provided Pt with CP post session, helped reduce symptoms  Plan: Continue per plan of care        Precautions: DOI 19  DOS: 19      Manual              STM, scar massage 8            AAROM wrist, FA, digits 10                                       18                Exercise Diary              Towel bunches 2 min            Ball roll for wrist 3 min            Wrist AROM             Grooved pegs 1 board            Pegs grasping 3 min                                                                                                                                                                                     HEP: digit, wrist, FA AROM, towel bunches              15                Modalities              MH  10            CP post TE 10

## 2019-07-22 ENCOUNTER — OFFICE VISIT (OUTPATIENT)
Dept: PHYSICAL THERAPY | Facility: MEDICAL CENTER | Age: 69
End: 2019-07-22
Payer: COMMERCIAL

## 2019-07-22 DIAGNOSIS — S52.591D OTHER CLOSED FRACTURE OF DISTAL END OF RIGHT RADIUS WITH ROUTINE HEALING, SUBSEQUENT ENCOUNTER: Primary | ICD-10-CM

## 2019-07-22 DIAGNOSIS — Z87.81 S/P ORIF (OPEN REDUCTION INTERNAL FIXATION) FRACTURE: ICD-10-CM

## 2019-07-22 DIAGNOSIS — Z98.890 S/P ORIF (OPEN REDUCTION INTERNAL FIXATION) FRACTURE: ICD-10-CM

## 2019-07-22 PROCEDURE — 97140 MANUAL THERAPY 1/> REGIONS: CPT

## 2019-07-22 PROCEDURE — 97110 THERAPEUTIC EXERCISES: CPT

## 2019-07-22 NOTE — PROGRESS NOTES
Daily Note     Today's date: 2019  Patient name: Vianca Gayle  : 1950  MRN: [id number]  Referring provider: Christy Pa MD  Dx:   Encounter Diagnosis     ICD-10-CM    1  Other closed fracture of distal end of right radius with routine healing, subsequent encounter S52 591D    2  S/P ORIF (open reduction internal fixation) fracture Z96 7     Z87 81                   Subjective: Pt reports she is still feeling stiff  Objective: See treatment diary below      Assessment: Tolerated treatment well  Patient exhibited good technique with therapeutic exercises and would benefit from continued PT Added gentle passive stretches for wrist to manuals and home program, pt able to demonstrate  Significant improvement in ROM from IE; AAROM flex/ext:  50/45  Sup/pron 70/75  Pt reported feeling good post CP  Plan: Continue per plan of care        Precautions: DOI 19  DOS: 19      Manual             STM, scar massage 8 5           AAROM wrist, FA, digits 10 10                                      18 15               Exercise Diary             Towel bunches 2 min 2 min           Ball roll for wrist 3 min 2 min           Wrist AROM  2x10           Grooved pegs 1 board 1 board           Pegs grasping 3 min                                                                                                                                                                                     HEP: digit, wrist, FA AROM, towel bunches              15 10               Modalities             MH  10 10           CP post TE 10 10

## 2019-07-26 ENCOUNTER — OFFICE VISIT (OUTPATIENT)
Dept: PHYSICAL THERAPY | Facility: MEDICAL CENTER | Age: 69
End: 2019-07-26
Payer: COMMERCIAL

## 2019-07-26 DIAGNOSIS — Z98.890 S/P ORIF (OPEN REDUCTION INTERNAL FIXATION) FRACTURE: ICD-10-CM

## 2019-07-26 DIAGNOSIS — Z87.81 S/P ORIF (OPEN REDUCTION INTERNAL FIXATION) FRACTURE: ICD-10-CM

## 2019-07-26 DIAGNOSIS — S52.591D OTHER CLOSED FRACTURE OF DISTAL END OF RIGHT RADIUS WITH ROUTINE HEALING, SUBSEQUENT ENCOUNTER: Primary | ICD-10-CM

## 2019-07-26 PROCEDURE — 97140 MANUAL THERAPY 1/> REGIONS: CPT

## 2019-07-26 PROCEDURE — 97110 THERAPEUTIC EXERCISES: CPT

## 2019-07-26 NOTE — PROGRESS NOTES
Daily Note     Today's date: 2019  Patient name: Akshat Rhodes  : 1950  MRN: [id number]  Referring provider: Luis Roche MD  Dx:   Encounter Diagnosis     ICD-10-CM    1  Other closed fracture of distal end of right radius with routine healing, subsequent encounter S52 591D    2  S/P ORIF (open reduction internal fixation) fracture Z96 7     Z87 81                   Subjective: Pt reports she has digit stiffness in morning whether she wears her splint or not  Objective: See treatment diary below      Assessment: Tolerated treatment well  Patient exhibited good technique with therapeutic exercises and would benefit from continued PT Mild long flexor tightness; passive comp flexion improving  Pt's ROM continuing to improve, pain at end range pronation along distal ulna  AAROM ext/flex 40/50  Sup 70/75  Pt tolerated exercises well  Plan: Continue per plan of care        Precautions: DOI 19  DOS: 19      Manual            STM, scar massage 8 5 5          AAROM wrist, FA, digits 10 10 10                                     18 15 15              Exercise Diary            Towel bunches 2 min 2 min 2 min          Ball roll for wrist 3 min 2 min 2 min          Wrist AROM  2x10 2x10          Grooved pegs 1 board 1 board 1 board          Pegs grasping 3 min  3 min                                                                                                                                                                                   HEP: digit, wrist, FA AROM, towel bunches              15 10 10              Modalities            MH  10 10 10          CP post TE 10 10 np

## 2019-07-31 ENCOUNTER — OFFICE VISIT (OUTPATIENT)
Dept: PHYSICAL THERAPY | Facility: MEDICAL CENTER | Age: 69
End: 2019-07-31
Payer: COMMERCIAL

## 2019-07-31 DIAGNOSIS — S52.591D OTHER CLOSED FRACTURE OF DISTAL END OF RIGHT RADIUS WITH ROUTINE HEALING, SUBSEQUENT ENCOUNTER: Primary | ICD-10-CM

## 2019-07-31 DIAGNOSIS — Z87.81 S/P ORIF (OPEN REDUCTION INTERNAL FIXATION) FRACTURE: ICD-10-CM

## 2019-07-31 DIAGNOSIS — Z98.890 S/P ORIF (OPEN REDUCTION INTERNAL FIXATION) FRACTURE: ICD-10-CM

## 2019-07-31 PROCEDURE — 97140 MANUAL THERAPY 1/> REGIONS: CPT

## 2019-07-31 PROCEDURE — 97110 THERAPEUTIC EXERCISES: CPT

## 2019-07-31 NOTE — PROGRESS NOTES
Daily Note     Today's date: 2019  Patient name: Mort Pallas  : 1950  MRN: [id number]  Referring provider: Dinorah Whitlock MD  Dx:   Encounter Diagnosis     ICD-10-CM    1  Other closed fracture of distal end of right radius with routine healing, subsequent encounter S52 591D    2  S/P ORIF (open reduction internal fixation) fracture Z96 7     Z87 81                   Subjective: Pt reports her wrist is feeling better  Able to wash dishes, fold laundry  Avoids any heavy lifting  Objective: See treatment diary below      Assessment: Tolerated treatment well  Patient exhibited good technique with therapeutic exercises and would benefit from continued PT AROM wrist ext/flex 40/50 , full comp flexion of digits  Reduction in edema  Pt reported feeling good post session  Plan: Continue per plan of care        Precautions: DOI 19  DOS: 19      Manual           STM, scar massage 8 5 5 5 retro         AAROM wrist, FA, digits 10 10 10 10                                    18 15 15 15             Exercise Diary           Towel bunches 2 min 2 min 2 min 2 min         Ball roll for wrist 3 min 2 min 2 min 2 min         Wrist AROM  2x10 2x10 2x10         Grooved pegs 1 board 1 board 1 board 1 board         Pegs grasping 3 min  3 min 3 min                                                                                                                                                                                  HEP: digit, wrist, FA AROM, towel bunches              15 10 10 10             Modalities           MH  10 10 10 10         CP post TE 10 10 np

## 2019-08-02 ENCOUNTER — APPOINTMENT (OUTPATIENT)
Dept: PHYSICAL THERAPY | Facility: MEDICAL CENTER | Age: 69
End: 2019-08-02
Payer: COMMERCIAL

## 2019-08-07 ENCOUNTER — OFFICE VISIT (OUTPATIENT)
Dept: PHYSICAL THERAPY | Facility: MEDICAL CENTER | Age: 69
End: 2019-08-07
Payer: COMMERCIAL

## 2019-08-07 DIAGNOSIS — Z98.890 S/P ORIF (OPEN REDUCTION INTERNAL FIXATION) FRACTURE: ICD-10-CM

## 2019-08-07 DIAGNOSIS — Z87.81 S/P ORIF (OPEN REDUCTION INTERNAL FIXATION) FRACTURE: ICD-10-CM

## 2019-08-07 DIAGNOSIS — S52.591D OTHER CLOSED FRACTURE OF DISTAL END OF RIGHT RADIUS WITH ROUTINE HEALING, SUBSEQUENT ENCOUNTER: Primary | ICD-10-CM

## 2019-08-07 PROCEDURE — 97140 MANUAL THERAPY 1/> REGIONS: CPT

## 2019-08-07 PROCEDURE — 97110 THERAPEUTIC EXERCISES: CPT

## 2019-08-07 NOTE — PROGRESS NOTES
Daily Note     Today's date: 2019  Patient name: Veronica Alex  : 1950  MRN: [id number]  Referring provider: Genoveva Stanton MD  Dx:   Encounter Diagnosis     ICD-10-CM    1  Other closed fracture of distal end of right radius with routine healing, subsequent encounter S52 591D    2  S/P ORIF (open reduction internal fixation) fracture Z96 7     Z87 81                   Subjective: Pt reported mild soreness in wrist after packing a lot yesterday      Objective: See treatment diary below      Assessment: Tolerated treatment well  Patient exhibited good technique with therapeutic exercises Pt's ROM continuing to improve  Tolerated exercises well  Plan: Continue per plan of care        Precautions: DOI 19  DOS: 19      Manual   8        STM, scar massage 8 5 5 5 retro NP        AAROM wrist, FA, digits 10 10 10 10 10                                   18 15 15 15 10            Exercise Diary   8/7        Towel bunches 2 min 2 min 2 min 2 min 2 min        Ball roll for wrist 3 min 2 min 2 min 2 min 2 min        Wrist AROM  2x10 2x10 2x10 2x10        Grooved pegs 1 board 1 board 1 board 1 board 1 board        Pegs grasping 3 min  3 min 3 min NP                                                                                                                                                                                 HEP: digit, wrist, FA AROM, towel bunches              15 10 10 10 10            Modalities   8/7        MH  10 10 10 10 10        CP post TE 10 10 np

## 2019-08-09 ENCOUNTER — OFFICE VISIT (OUTPATIENT)
Dept: PHYSICAL THERAPY | Facility: MEDICAL CENTER | Age: 69
End: 2019-08-09
Payer: COMMERCIAL

## 2019-08-09 DIAGNOSIS — Z87.81 S/P ORIF (OPEN REDUCTION INTERNAL FIXATION) FRACTURE: ICD-10-CM

## 2019-08-09 DIAGNOSIS — S52.591D OTHER CLOSED FRACTURE OF DISTAL END OF RIGHT RADIUS WITH ROUTINE HEALING, SUBSEQUENT ENCOUNTER: Primary | ICD-10-CM

## 2019-08-09 DIAGNOSIS — Z98.890 S/P ORIF (OPEN REDUCTION INTERNAL FIXATION) FRACTURE: ICD-10-CM

## 2019-08-09 PROCEDURE — 97110 THERAPEUTIC EXERCISES: CPT

## 2019-08-09 PROCEDURE — 97140 MANUAL THERAPY 1/> REGIONS: CPT

## 2019-08-09 NOTE — PROGRESS NOTES
Daily Note     Today's date: 2019  Patient name: Pablo Ambrocio  : 1950  MRN: [id number]  Referring provider: Jim Nissen, MD  Dx:   Encounter Diagnosis     ICD-10-CM    1  Other closed fracture of distal end of right radius with routine healing, subsequent encounter S52 591D    2  S/P ORIF (open reduction internal fixation) fracture Z96 7     Z87 81                   Subjective: Pt reports she tried lifting grocery bag yesterday and had difficulty  Objective: See treatment diary below      Assessment: Tolerated treatment well  Patient exhibited good technique with therapeutic exercises and would benefit from continued PT  Pt had slight increase in stiffness in wrist especially in wrist extension; full rotation of FA  Initiated light strengthening, pt tolerated exercises well  No reported soreness after  Plan: Continue per plan of care        Precautions: DOI 19  DOS: 19      Manual   8 8/9       STM, scar massage 8 5 5 5 retro NP        AAROM wrist, FA, digits 10 10 10 10 10 10                                  18 15 15 15 10 10           Exercise Diary   8 8/9       Towel bunches 2 min 2 min 2 min 2 min 2 min NP       Ball roll for wrist 3 min 2 min 2 min 2 min 2 min 2 min       Wrist AROM  2x10 2x10 2x10 2x10 1#       Grooved pegs 1 board 1 board 1 board 1 board 1 board NP       Pegs grasping 3 min  3 min 3 min NP                     fingerweb      Red 30x       flexbar towel twist      yellow 20x       flexbar sup/pron      yellow                                                                                                                            HEP: digit, wrist, FA AROM, towel bunches              15 10 10 10 10 15           Modalities   87 8/9       MH  10 10 10 10 10 10       CP post TE 10 10 np

## 2019-08-12 ENCOUNTER — OFFICE VISIT (OUTPATIENT)
Dept: PHYSICAL THERAPY | Facility: MEDICAL CENTER | Age: 69
End: 2019-08-12
Payer: COMMERCIAL

## 2019-08-12 DIAGNOSIS — S52.591D OTHER CLOSED FRACTURE OF DISTAL END OF RIGHT RADIUS WITH ROUTINE HEALING, SUBSEQUENT ENCOUNTER: Primary | ICD-10-CM

## 2019-08-12 DIAGNOSIS — Z98.890 S/P ORIF (OPEN REDUCTION INTERNAL FIXATION) FRACTURE: ICD-10-CM

## 2019-08-12 DIAGNOSIS — Z87.81 S/P ORIF (OPEN REDUCTION INTERNAL FIXATION) FRACTURE: ICD-10-CM

## 2019-08-12 PROCEDURE — 97140 MANUAL THERAPY 1/> REGIONS: CPT

## 2019-08-12 NOTE — PROGRESS NOTES
Daily Note     Today's date: 2019  Patient name: Sue Ross  : 1950  MRN: [id number]  Referring provider: Sara Bustillo MD  Dx:   Encounter Diagnosis     ICD-10-CM    1  Other closed fracture of distal end of right radius with routine healing, subsequent encounter S52 591D    2  S/P ORIF (open reduction internal fixation) fracture Z96 7     Z87 81                   Subjective: Pt reports she has increased stiffness to her IF, MF  She noticed a pulling pain to back of fingers and down into forearm  Objective: See treatment diary below      Assessment: Tolerated treatment well  Patient exhibited good technique with therapeutic exercises and would benefit from continued PT demonstrated extrinsic extensor stretch to pt  Tolerated exercises well  ROM approaching full ranges         Plan:  POC NV     Precautions: DOI 19  DOS: 19      Manual        STM, scar massage 8 5 5 5 retro NP        AAROM wrist, FA, digits 10 10 10 10 10 10 10             extrinsic flex/ext stretch                    18 15 15 15 10 10 15          Exercise Diary        Towel bunches 2 min 2 min 2 min 2 min 2 min NP       Ball roll for wrist 3 min 2 min 2 min 2 min 2 min 2 min 2 min      Wrist AROM  2x10 2x10 2x10 2x10 1# 1# 2x10      Grooved pegs 1 board 1 board 1 board 1 board 1 board NP       Pegs grasping 3 min  3 min 3 min NP                     fingerweb      Red 30x Red 30x      flexbar towel twist      yellow 20x Red 20x      flexbar sup/pron      yellow Red 20x      Putty press       yellow 3 min                                                                                                              HEP: digit, wrist, FA AROM, towel bunches              15 10 10 10 10 15 15 (uncharged)          Modalities        MH  10 10 10 10 10 10 10      CP post TE 10 10 np

## 2019-08-14 ENCOUNTER — OFFICE VISIT (OUTPATIENT)
Dept: PHYSICAL THERAPY | Facility: MEDICAL CENTER | Age: 69
End: 2019-08-14
Payer: COMMERCIAL

## 2019-08-14 DIAGNOSIS — Z98.890 S/P ORIF (OPEN REDUCTION INTERNAL FIXATION) FRACTURE: ICD-10-CM

## 2019-08-14 DIAGNOSIS — S52.591D OTHER CLOSED FRACTURE OF DISTAL END OF RIGHT RADIUS WITH ROUTINE HEALING, SUBSEQUENT ENCOUNTER: Primary | ICD-10-CM

## 2019-08-14 DIAGNOSIS — Z87.81 S/P ORIF (OPEN REDUCTION INTERNAL FIXATION) FRACTURE: ICD-10-CM

## 2019-08-14 PROCEDURE — G8985 CARRY GOAL STATUS: HCPCS

## 2019-08-14 PROCEDURE — G8984 CARRY CURRENT STATUS: HCPCS

## 2019-08-14 PROCEDURE — 97110 THERAPEUTIC EXERCISES: CPT

## 2019-08-14 PROCEDURE — 97140 MANUAL THERAPY 1/> REGIONS: CPT

## 2019-08-14 NOTE — PROGRESS NOTES
PT Re-Evaluation     Today's date: 2019  Patient name: Katiana Hopkins  : 1950  MRN: [id number]  Referring provider: Jacinto Rucker MD  Dx:   Encounter Diagnosis     ICD-10-CM    1  Other closed fracture of distal end of right radius with routine healing, subsequent encounter S52 591D    2  S/P ORIF (open reduction internal fixation) fracture Z96 7     Z87 81                   Assessment  Assessment details: Pt is progressing very well with therapy  She has made gains in her ROM and is within 5-10 degrees from her uninvolved side  We initiated light strengthening in her R wrist last week and she has been tolerating gentle progression well  Pt has exceptional strength in her wrist stabilizers and is within 15 lbs of  strength compared to her L side  Recommend another 3-4 weeks of therapy to further advance her ROM and strength and better her overall functioning  Thank you  Impairments: abnormal or restricted ROM, activity intolerance, impaired physical strength and pain with function  Understanding of Dx/Px/POC: good   Prognosis: good    Goals  STG (2-3 weeks)  1: Reduce pain 2-3 grades on VAS- met  2:  Increase AROM 10-15 degrees or WFL- met  3: full comp flexion of digits- met  3: Pt independent in HEP- met    LTG (6-8 weeks)  1: Improve FOTO 20 pts- met  2: PROM WNL- met  3: wrist strength 4 to 4+/5- met  4:  strength 25 lbs or higher- met  5: pt able to perform light ADL's without difficulty- met      Plan  Plan details: Continue PT at your discretion  Patient would benefit from: skilled physical therapy  Planned modality interventions: cryotherapy and thermotherapy: hydrocollator packs  Planned therapy interventions: joint mobilization, manual therapy, massage, neuromuscular re-education, strengthening, stretching, therapeutic activities, therapeutic exercise, home exercise program, functional ROM exercises, flexibility, fine motor coordination training and graded exercise  Frequency: 2x week  Duration in visits: 8  Duration in weeks: 4  Plan of Care beginning date: 2019  Plan of Care expiration date: 10/9/2019  Treatment plan discussed with: patient        Subjective Evaluation    History of Present Illness  Date of onset: 2019  Date of surgery: 2019  Mechanism of injury: surgery and trauma  Mechanism of injury: Hanging a picture, stepped off chair and lost balance and fell  Went to ER- x-ray  ORIF 11 days later  removeable splint last week  Denies parasthesias  Re-eval: Pt reports improved symptoms, able to do more ADL's at home, still difficult lifting heavier items   Some stiffness in IF,MF in back of hand, sometimes hard making a fist but its gradually getting better            Not a recurrent problem   Quality of life: good    Pain  Current pain ratin  At best pain ratin  At worst pain rating: 3  Quality: dull ache  Relieving factors: ice  Progression: improved    Social Support  Lives with: spouse    Employment status: not working (retired)  Hand dominance: right      Diagnostic Tests  X-ray: normal  Patient Goals  Patient goals for therapy: decreased edema, decreased pain, increased motion, increased strength and independence with ADLs/IADLs          Objective     Observations     Additional Observation Details  Incision healed    Active Range of Motion     Left Wrist   Wrist flexion: 60 degrees   Wrist extension: 60 degrees   Radial deviation: 28 degrees   Ulnar deviation: 42 degrees     Right Wrist   Wrist flexion: 50 degrees   Wrist extension: 45 degrees   Radial deviation: 20 degrees   Ulnar deviation: 35 degrees     Right Thumb   Opposition: Full opp to base of SF    Additional Active Range of Motion Details  Sup/pron 70/75  Pt able to form full comp fist- mild dorsal tension to extrinsic extensors    Passive Range of Motion     Right Wrist   Wrist flexion: 55 degrees   Wrist extension: 52 degrees     Strength/Myotome Testing     Left Wrist/Hand      (2nd hand position)     Trial 1: 50    Thumb Strength  Key/Lateral Pinch     [place] 1: 12  Palmar/Three-Point Pinch     Trial 1: 11    Right Wrist/Hand   Wrist extension: 5  Wrist flexion: 5  Radial deviation: 5  Ulnar deviation: 5     (2nd hand position)     Trial 1: 35    Thumb Strength   Key/Lateral Pinch     Trial 1: 9  Palmar/Three-Point Pinch     Trial 1: 10    Swelling     Left Wrist/Hand   Circumference MCP: 19 cm  Circumference wrist: 16 3 cm    Right Wrist/Hand   Circumference MCP: 19 5 cm  Circumference wrist: 18 cm             Precautions: DOI 6/16/19  DOS: 6/27/19      Manual  7/19 7/22 7/26 7/31 8/7 8/9 8/12 8/14     STM, scar massage 8 5 5 5 retro NP        AAROM wrist, FA, digits 10 10 10 10 10 10 10 10            extrinsic flex/ext stretch 5 meaurements                   18 15 15 15 10 10 15 15         Exercise Diary  7/19 7/22 7/26 7/31 8/7 8/9 8/12 8/14     Towel bunches 2 min 2 min 2 min 2 min 2 min NP       Ball roll for wrist 3 min 2 min 2 min 2 min 2 min 2 min 2 min 2 min     Wrist AROM  2x10 2x10 2x10 2x10 1# 1# 2x10 2# 2x10     Grooved pegs 1 board 1 board 1 board 1 board 1 board NP       Pegs grasping 3 min  3 min 3 min NP                     fingerweb      Red 30x Red 30x Red 30x     flexbar towel twist      yellow 20x Red 20x Red 20x     flexbar sup/pron      yellow Red 20x Red 20x     Putty press       yellow 3 min yellow 3 min                                                                                                             HEP: digit, wrist, FA AROM, towel bunches              15 10 10 10 10 15 15 (uncharged) 15         Modalities  7/19 7/22 7/26 7/31 8/7 8/9 8/12 8/14     MH  10 10 10 10 10 10 10 10 min     CP post TE 10 10 np

## 2019-08-19 ENCOUNTER — APPOINTMENT (OUTPATIENT)
Dept: PHYSICAL THERAPY | Facility: MEDICAL CENTER | Age: 69
End: 2019-08-19
Payer: COMMERCIAL

## 2019-08-21 ENCOUNTER — OFFICE VISIT (OUTPATIENT)
Dept: PHYSICAL THERAPY | Facility: MEDICAL CENTER | Age: 69
End: 2019-08-21
Payer: COMMERCIAL

## 2019-08-21 DIAGNOSIS — Z98.890 S/P ORIF (OPEN REDUCTION INTERNAL FIXATION) FRACTURE: ICD-10-CM

## 2019-08-21 DIAGNOSIS — Z87.81 S/P ORIF (OPEN REDUCTION INTERNAL FIXATION) FRACTURE: ICD-10-CM

## 2019-08-21 DIAGNOSIS — S52.591D OTHER CLOSED FRACTURE OF DISTAL END OF RIGHT RADIUS WITH ROUTINE HEALING, SUBSEQUENT ENCOUNTER: Primary | ICD-10-CM

## 2019-08-21 PROCEDURE — 97110 THERAPEUTIC EXERCISES: CPT | Performed by: PHYSICAL THERAPIST

## 2019-08-21 NOTE — PROGRESS NOTES
Daily Note     Today's date: 2019  Patient name: Siddhartha Cui  : 1950  MRN: [id number]  Referring provider: Hollie Mccarthy MD  Dx:   Encounter Diagnosis     ICD-10-CM    1  Other closed fracture of distal end of right radius with routine healing, subsequent encounter S52 591D    2  S/P ORIF (open reduction internal fixation) fracture Z96 7     Z87 81        Start Time: 1130  Stop Time: 1155  Total time in clinic (min): 25 minutes    Subjective: Patient's only complaint is stiffness in her fingers first thing in the morning  Otherwise she reports minimal to no functional deficits  She follows up with the surgeon tomorrow  Objective: See treatment diary below      Assessment: Patient continues to tolerate treatment well with no aggravation of sx reported following today's session  She declined any stiffness/ROM deficits at this session and declined manual techniques today  Plan: Continue per plan of care        Precautions: DOI 19  DOS: 19      Manual      STM, scar massage 8 5 5 5 retro NP        AAROM wrist, FA, digits 10 10 10 10 10 10 10 10 declined           extrinsic flex/ext stretch 5 meaurements                   18 15 15 15 10 10 15 15 0        Exercise Diary   8    Towel bunches 2 min 2 min 2 min 2 min 2 min NP       Ball roll for wrist 3 min 2 min 2 min 2 min 2 min 2 min 2 min 2 min 2 min    Wrist AROM  2x10 2x10 2x10 2x10 1# 1# 2x10 2# 2x10 2# 2x10    Grooved pegs 1 board 1 board 1 board 1 board 1 board NP       Pegs grasping 3 min  3 min 3 min NP                     fingerweb      Red 30x Red 30x Red 30x Red 30x    flexbar towel twist      yellow 20x Red 20x Red 20x Red 20x    flexbar sup/pron      yellow Red 20x Red 20x Red 20x    Putty press       yellow 3 min yellow 3 min yellow 3 min HEP: digit, wrist, FA AROM, towel bunches              15 10 10 10 10 15 15 (uncharged) 15 15        Modalities  7/19 7/22 7/26 7/31 8/7 8/9 8/12 8/14 8/21    MH  10 10 10 10 10 10 10 10 min 10 min    CP post TE 10 10 np

## 2019-08-23 ENCOUNTER — HOSPITAL ENCOUNTER (OUTPATIENT)
Dept: RADIOLOGY | Facility: HOSPITAL | Age: 69
Discharge: HOME | End: 2019-08-23
Attending: ORTHOPAEDIC SURGERY
Payer: COMMERCIAL

## 2019-08-23 ENCOUNTER — OFFICE VISIT (OUTPATIENT)
Dept: OBGYN CLINIC | Facility: HOSPITAL | Age: 69
End: 2019-08-23

## 2019-08-23 VITALS
DIASTOLIC BLOOD PRESSURE: 61 MMHG | SYSTOLIC BLOOD PRESSURE: 128 MMHG | HEART RATE: 64 BPM | HEIGHT: 63 IN | BODY MASS INDEX: 26.22 KG/M2 | WEIGHT: 148 LBS

## 2019-08-23 DIAGNOSIS — Z98.890 S/P ORIF (OPEN REDUCTION INTERNAL FIXATION) FRACTURE: Primary | ICD-10-CM

## 2019-08-23 DIAGNOSIS — Z98.890 S/P ORIF (OPEN REDUCTION INTERNAL FIXATION) FRACTURE: ICD-10-CM

## 2019-08-23 DIAGNOSIS — Z87.81 S/P ORIF (OPEN REDUCTION INTERNAL FIXATION) FRACTURE: Primary | ICD-10-CM

## 2019-08-23 DIAGNOSIS — Z87.81 S/P ORIF (OPEN REDUCTION INTERNAL FIXATION) FRACTURE: ICD-10-CM

## 2019-08-23 PROCEDURE — 99024 POSTOP FOLLOW-UP VISIT: CPT | Performed by: ORTHOPAEDIC SURGERY

## 2019-08-23 PROCEDURE — 73110 X-RAY EXAM OF WRIST: CPT

## 2019-08-23 NOTE — PROGRESS NOTES
Assessment:   S/P R distal radius ORIF 06/27/19    Plan:   Xrays look good  Continue therapy for ROM and strength  Activities as tolerated    Follow Up:  6  week(s)    To Do Next Visit:  X-rays of the  right  wrist      CHIEF COMPLAINT:  Chief Complaint   Patient presents with    Right Arm - Post-op         SUBJECTIVE:  Mandi Saha is a 77 y o  female who presents for follow up S/P R distal radius ORIF 06/27/19  Patient states overall she is doing well  No pain  Motion has improved with therapy and they recently started strengthening  PHYSICAL EXAMINATION:  Vital signs: /61   Pulse 64   Ht 5' 3" (1 6 m)   Wt 67 1 kg (148 lb)   BMI 26 22 kg/m²   General: well developed and well nourished, alert, oriented times 3 and appears comfortable  Psychiatric: Normal    MUSCULOSKELETAL EXAMINATION:  Incision: Clean, dry, intact  Range of Motion: Extension lacks just a few degrees compared to left side, flexion lacks approx 10 degrees, supination lacks approx 5 degrees, pronation full  Neurovascular status: Neuro intact, good cap refill  Activity Restrictions: No restrictions         STUDIES REVIEWED:  Images were reviewd in PACS: Xrays show healing distal radius fracture s/p ORIF  No evidence of hardware failure        PROCEDURES PERFORMED:  Procedures  No Procedures performed today   Scribe Attestation    I,:   Anurag Horowitz PA-C am acting as a scribe while in the presence of the attending physician :        I,:   Christopher Levy MD personally performed the services described in this documentation    as scribed in my presence :

## 2019-08-28 ENCOUNTER — OFFICE VISIT (OUTPATIENT)
Dept: PHYSICAL THERAPY | Facility: MEDICAL CENTER | Age: 69
End: 2019-08-28
Payer: COMMERCIAL

## 2019-08-28 DIAGNOSIS — S52.591D OTHER CLOSED FRACTURE OF DISTAL END OF RIGHT RADIUS WITH ROUTINE HEALING, SUBSEQUENT ENCOUNTER: Primary | ICD-10-CM

## 2019-08-28 DIAGNOSIS — Z87.81 S/P ORIF (OPEN REDUCTION INTERNAL FIXATION) FRACTURE: ICD-10-CM

## 2019-08-28 DIAGNOSIS — Z98.890 S/P ORIF (OPEN REDUCTION INTERNAL FIXATION) FRACTURE: ICD-10-CM

## 2019-08-28 PROCEDURE — 97140 MANUAL THERAPY 1/> REGIONS: CPT

## 2019-08-28 PROCEDURE — 97530 THERAPEUTIC ACTIVITIES: CPT

## 2019-08-30 ENCOUNTER — OFFICE VISIT (OUTPATIENT)
Dept: PHYSICAL THERAPY | Facility: MEDICAL CENTER | Age: 69
End: 2019-08-30
Payer: COMMERCIAL

## 2019-08-30 DIAGNOSIS — Z98.890 S/P ORIF (OPEN REDUCTION INTERNAL FIXATION) FRACTURE: ICD-10-CM

## 2019-08-30 DIAGNOSIS — Z87.81 S/P ORIF (OPEN REDUCTION INTERNAL FIXATION) FRACTURE: ICD-10-CM

## 2019-08-30 DIAGNOSIS — S52.591D OTHER CLOSED FRACTURE OF DISTAL END OF RIGHT RADIUS WITH ROUTINE HEALING, SUBSEQUENT ENCOUNTER: Primary | ICD-10-CM

## 2019-08-30 PROCEDURE — 97530 THERAPEUTIC ACTIVITIES: CPT

## 2019-08-30 PROCEDURE — 97140 MANUAL THERAPY 1/> REGIONS: CPT

## 2019-08-30 NOTE — PROGRESS NOTES
Daily Note     Today's date: 2019  Patient name: Ananth Yoo  : 1950  MRN: [id number]  Referring provider: Ryne Bhardwaj MD  Dx:   Encounter Diagnosis     ICD-10-CM    1  Other closed fracture of distal end of right radius with routine healing, subsequent encounter S52 591D    2  S/P ORIF (open reduction internal fixation) fracture Z96 7     Z87 81                   Subjective: Pt reports wrist is feeling okay  Objective: See treatment diary below      Assessment: Tolerated treatment well  Patient exhibited good technique with therapeutic exercises and would benefit from continued PT Mild stiffness at end ranges in ext and flexion  Pt tolerated exercises well  No pain post session  Continue to progress as tolerated  Plan: Continue per plan of care        Precautions: DOI 19  DOS: 19      Manual              STM, scar massage             AAROM wrist, FA, digits 10                                                       Exercise Diary                           Ball roll for wrist 2 min            Wrist PRE's 2# 3#           flexbar towel twist Red 20x green           flexbar sup/pron Red 20x green           fingerweb digit flex green blue           Putty press [id number]            Hand helper  Blue/2 green 30x            Hammer RD/UD NV                                                                                                                                 HEP: digit, wrist, FA AROM, towel bunches              10/10                Modalities              MH  10 min            CP post TE

## 2019-09-03 ENCOUNTER — OFFICE VISIT (OUTPATIENT)
Dept: PHYSICAL THERAPY | Facility: MEDICAL CENTER | Age: 69
End: 2019-09-03
Payer: COMMERCIAL

## 2019-09-03 DIAGNOSIS — S52.591D OTHER CLOSED FRACTURE OF DISTAL END OF RIGHT RADIUS WITH ROUTINE HEALING, SUBSEQUENT ENCOUNTER: Primary | ICD-10-CM

## 2019-09-03 DIAGNOSIS — Z98.890 S/P ORIF (OPEN REDUCTION INTERNAL FIXATION) FRACTURE: ICD-10-CM

## 2019-09-03 DIAGNOSIS — Z87.81 S/P ORIF (OPEN REDUCTION INTERNAL FIXATION) FRACTURE: ICD-10-CM

## 2019-09-03 PROCEDURE — 97110 THERAPEUTIC EXERCISES: CPT

## 2019-09-03 PROCEDURE — 97140 MANUAL THERAPY 1/> REGIONS: CPT

## 2019-09-06 ENCOUNTER — OFFICE VISIT (OUTPATIENT)
Dept: PHYSICAL THERAPY | Facility: MEDICAL CENTER | Age: 69
End: 2019-09-06
Payer: COMMERCIAL

## 2019-09-06 DIAGNOSIS — Z98.890 S/P ORIF (OPEN REDUCTION INTERNAL FIXATION) FRACTURE: ICD-10-CM

## 2019-09-06 DIAGNOSIS — Z87.81 S/P ORIF (OPEN REDUCTION INTERNAL FIXATION) FRACTURE: ICD-10-CM

## 2019-09-06 DIAGNOSIS — S52.591D OTHER CLOSED FRACTURE OF DISTAL END OF RIGHT RADIUS WITH ROUTINE HEALING, SUBSEQUENT ENCOUNTER: Primary | ICD-10-CM

## 2019-09-06 PROCEDURE — 97140 MANUAL THERAPY 1/> REGIONS: CPT

## 2019-09-06 NOTE — PROGRESS NOTES
Daily Note     Today's date: 2019  Patient name: Sue Ross  : 1950  MRN: [id number]  Referring provider: Sara Bustillo MD  Dx:   Encounter Diagnosis     ICD-10-CM    1  Other closed fracture of distal end of right radius with routine healing, subsequent encounter S52 591D    2  S/P ORIF (open reduction internal fixation) fracture Z96 7     Z87 81                   Subjective: Pt reports wrist has been feeling good  Objective: See treatment diary below      Assessment: Tolerated treatment well  Patient exhibited good technique with therapeutic exercises and would benefit from continued PT no c/o post session  PROM WNLPossibly 1-2 more visits then D/C to HEP  Plan: Continue per plan of care        Precautions: DOI 19  DOS: 19      Manual  8/30 9/3 9/6          STM, scar massage             AAROM wrist, FA, digits 10 10 10                                                     Exercise Diary  8/30 9/3 9/6                       Ball roll for wrist 2 min np           Wrist PRE's 2# 3# 3x10 3# 3x10          flexbar towel twist Red 20x Green 20x Green 20x          flexbar sup/pron Red 20x [id number] Green 20x          fingerweb digit flex green Blue 30x Blue 30x          Putty press [id number] Red 3 min Red 3 min          Hand helper  Blue/2 green 30x Blue 2 green 30x Blue/2green 30x          Hammer RD/UD NV NV 15x                                                                                                                               HEP: digit, wrist, FA AROM, towel bunches              10/10 15 15 (uncharged)              Modalities  8/30 9/3 9/6          MH  10 min 10 10          CP post TE

## 2019-09-11 ENCOUNTER — APPOINTMENT (OUTPATIENT)
Dept: PHYSICAL THERAPY | Facility: MEDICAL CENTER | Age: 69
End: 2019-09-11
Payer: COMMERCIAL

## 2019-09-13 ENCOUNTER — OFFICE VISIT (OUTPATIENT)
Dept: PHYSICAL THERAPY | Facility: MEDICAL CENTER | Age: 69
End: 2019-09-13
Payer: COMMERCIAL

## 2019-09-13 DIAGNOSIS — S52.591D OTHER CLOSED FRACTURE OF DISTAL END OF RIGHT RADIUS WITH ROUTINE HEALING, SUBSEQUENT ENCOUNTER: Primary | ICD-10-CM

## 2019-09-13 DIAGNOSIS — Z87.81 S/P ORIF (OPEN REDUCTION INTERNAL FIXATION) FRACTURE: ICD-10-CM

## 2019-09-13 DIAGNOSIS — Z98.890 S/P ORIF (OPEN REDUCTION INTERNAL FIXATION) FRACTURE: ICD-10-CM

## 2019-09-13 PROCEDURE — 97110 THERAPEUTIC EXERCISES: CPT

## 2019-09-13 PROCEDURE — 97140 MANUAL THERAPY 1/> REGIONS: CPT

## 2019-09-13 NOTE — PROGRESS NOTES
Daily Note     Today's date: 2019  Patient name: Josie Giang  : 1950  MRN: [id number]  Referring provider: Thierry Fisher MD  Dx:   Encounter Diagnosis     ICD-10-CM    1  Other closed fracture of distal end of right radius with routine healing, subsequent encounter S52 591D    2  S/P ORIF (open reduction internal fixation) fracture Z96 7     Z87 81                   Subjective: Noticed when she was playing tablet game, her wrist stiffened up a lot  Sometimes plays for up to an hour  Objective: See treatment diary below      Assessment: Tolerated treatment well  Patient exhibited good technique with therapeutic exercises and would benefit from continued PT ROM WNL; Increased resistance to wrist PRE's, pt tolerated well  Plan: Continue per plan of care        Precautions: DOI 19  DOS: 19      Manual  8/30 9/3 9/6 9/13         STM, scar massage             AAROM wrist, FA, digits 10 10 10 10                                                    Exercise Diary  8/30 9/3 9/6 9/13                      Ball roll for wrist 2 min np           Wrist PRE's 2# 3# 3x10 3# 3x10 4# 2x10         flexbar towel twist Red 20x Green 20x Green 20x Green 20x         flexbar sup/pron Red 20x [id number] Green 20x Green 20x         fingerweb digit flex green Blue 30x Blue 30x Blue 30x         Putty press [id number] Red 3 min Red 3 min Red 3 min         Hand helper  Blue/2 green 30x Blue 2 green 30x Blue/2green 30x Blue/ 2 [id number]         Hammer RD/UD NV NV 15x 15x                                                                                                                              HEP: digit, wrist, FA AROM, towel bunches              10/10 15 15 (uncharged) 15             Modalities  8/30 9/3 9/6 9/13         MH  10 min 10 10 10         CP post TE

## 2019-09-18 ENCOUNTER — OFFICE VISIT (OUTPATIENT)
Dept: PHYSICAL THERAPY | Facility: MEDICAL CENTER | Age: 69
End: 2019-09-18
Payer: COMMERCIAL

## 2019-09-18 DIAGNOSIS — S52.591D OTHER CLOSED FRACTURE OF DISTAL END OF RIGHT RADIUS WITH ROUTINE HEALING, SUBSEQUENT ENCOUNTER: Primary | ICD-10-CM

## 2019-09-18 DIAGNOSIS — Z87.81 S/P ORIF (OPEN REDUCTION INTERNAL FIXATION) FRACTURE: ICD-10-CM

## 2019-09-18 DIAGNOSIS — Z98.890 S/P ORIF (OPEN REDUCTION INTERNAL FIXATION) FRACTURE: ICD-10-CM

## 2019-09-18 PROCEDURE — 97110 THERAPEUTIC EXERCISES: CPT

## 2019-09-18 PROCEDURE — 97140 MANUAL THERAPY 1/> REGIONS: CPT

## 2019-09-18 NOTE — PROGRESS NOTES
Daily Note     Today's date: 2019  Patient name: Quentin Heimlich  : 1950  MRN: [id number]  Referring provider: Mohamud Encinas MD  Dx:   Encounter Diagnosis     ICD-10-CM    1  Other closed fracture of distal end of right radius with routine healing, subsequent encounter S52 591D    2  S/P ORIF (open reduction internal fixation) fracture Z96 7     Z87 81                   Subjective: Wrist has been feeling pretty good  Objective: See treatment diary below      Assessment: Tolerated treatment well  Patient exhibited good technique with therapeutic exercises and would benefit from continued PT mild increase in stiffness in end ranges for both wrist flexion and extension  Pt tolerated exercises well  Reassess NV, potential D/C to home program        Plan: Continue per plan of care  Potential discharge next visit       Precautions: DOI 19  DOS: 19      Manual  8/30 9/3 9/6 9/13         STM, scar massage             AAROM wrist, FA, digits 10 10 10 10                                                    Exercise Diary  8/30 9/3 9/6 9/13                      Ball roll for wrist 2 min np           Wrist PRE's 2# 3# 3x10 3# 3x10 4# 2x10         flexbar towel twist Red 20x Green 20x Green 20x Green 20x         flexbar sup/pron Red 20x [id number] Green 20x Green 20x         fingerweb digit flex green Blue 30x Blue 30x Blue 30x         Putty press [id number] Red 3 min Red 3 min Red 3 min         Hand helper  Blue/2 green 30x Blue 2 green 30x Blue/2green 30x Blue/ 2 [id number]         Hammer RD/UD NV NV 15x 15x                                                                                                                              HEP: digit, wrist, FA AROM, towel bunches              10/10 15 15 (uncharged) 15             Modalities  8/30 9/3 9/6 9/13         MH  10 min 10 10 10         CP post TE

## 2019-09-20 ENCOUNTER — OFFICE VISIT (OUTPATIENT)
Dept: PHYSICAL THERAPY | Facility: MEDICAL CENTER | Age: 69
End: 2019-09-20
Payer: COMMERCIAL

## 2019-09-20 DIAGNOSIS — Z87.81 S/P ORIF (OPEN REDUCTION INTERNAL FIXATION) FRACTURE: ICD-10-CM

## 2019-09-20 DIAGNOSIS — Z98.890 S/P ORIF (OPEN REDUCTION INTERNAL FIXATION) FRACTURE: ICD-10-CM

## 2019-09-20 DIAGNOSIS — S52.591D OTHER CLOSED FRACTURE OF DISTAL END OF RIGHT RADIUS WITH ROUTINE HEALING, SUBSEQUENT ENCOUNTER: Primary | ICD-10-CM

## 2019-09-20 PROCEDURE — G8986 CARRY D/C STATUS: HCPCS

## 2019-09-20 PROCEDURE — 97140 MANUAL THERAPY 1/> REGIONS: CPT

## 2019-09-20 PROCEDURE — G8985 CARRY GOAL STATUS: HCPCS

## 2019-09-20 NOTE — PROGRESS NOTES
Daily Note/Discharge     Today's date: 2019  Patient name: Betty Duran  : 1950  MRN: [id number]  Referring provider: Ronen Koehler MD  Dx:   Encounter Diagnosis     ICD-10-CM    1  Other closed fracture of distal end of right radius with routine healing, subsequent encounter S52 591D    2  S/P ORIF (open reduction internal fixation) fracture Z96 7     Z87 81                   Subjective: pt reports wrist has been feeling good  Not limited in daily activities except heavy lifting  Objective: See treatment diary below      Assessment: Tolerated treatment well  Patient exhibited good technique with therapeutic exercises and would benefit from continued PT Reviewed HEP with pt, continue with stretching especially wrist ext  AROM wrist ext/flex    50/60  PROM 55/65  Sup/pron 85/75   II R/L 50/55  Lp   3-pt  10/11  Goals  STG (2-3 weeks)  1: Reduce pain 2-3 grades on VAS- met  2: Increase AROM 10-15 degrees or WFL- met  3: full comp flexion of digits- met  3: Pt independent in HEP- met    LTG (6-8 weeks)  1: Improve FOTO 20 pts- met  2: PROM WNL- met  3: wrist strength 4 to 4+/5- met  4:  strength 25 lbs or higher- met  5: pt able to perform light ADL's without difficulty- met  Pt has achieve her therapy goals, D/C to HEP    Plan: Continue per plan of care        Precautions: DOI 19  DOS: 19      Manual  8/30 9/3 9/6 9/13 9/18 9/20       STM, scar massage             AAROM wrist, FA, digits 10 10 10 10 10                                                   Exercise Diary  8/30 9/3 9/6 9/13 9/18                     Ball roll for wrist 2 min np           Wrist PRE's 2# 3# 3x10 3# 3x10 4# 2x10 4# 2x10        flexbar towel twist Red 20x Green 20x Green 20x Green 20x Green 20x        flexbar sup/pron Red 20x [id number] Green 20x Green 20x Green 20x        fingerweb digit flex green Blue 30x Blue 30x Blue 30x Blue 30x        Putty press [id number] Red 3 min Red 3 min Red 3 min Red 3 min        Hand helper  Blue/2 green 30x Blue 2 green 30x Blue/2green 30x Blue/ 2 [id number] 2 blue 30x        Hammer RD/UD NV NV 15x 15x 15x                                                                                                                             HEP: digit, wrist, FA AROM, towel bunches              10/10 15 15 (uncharged) 15             Modalities  8/30 9/3 9/6 9/13         MH  10 min 10 10 10         CP post TE

## 2019-10-18 ENCOUNTER — OFFICE VISIT (OUTPATIENT)
Dept: OBGYN CLINIC | Facility: HOSPITAL | Age: 69
End: 2019-10-18
Payer: COMMERCIAL

## 2019-10-18 ENCOUNTER — HOSPITAL ENCOUNTER (OUTPATIENT)
Dept: RADIOLOGY | Facility: HOSPITAL | Age: 69
Discharge: HOME | End: 2019-10-18
Attending: ORTHOPAEDIC SURGERY
Payer: COMMERCIAL

## 2019-10-18 VITALS
HEIGHT: 63 IN | SYSTOLIC BLOOD PRESSURE: 133 MMHG | DIASTOLIC BLOOD PRESSURE: 76 MMHG | HEART RATE: 63 BPM | BODY MASS INDEX: 26.22 KG/M2

## 2019-10-18 DIAGNOSIS — Z48.89 AFTERCARE FOLLOWING SURGERY: ICD-10-CM

## 2019-10-18 DIAGNOSIS — Z48.89 AFTERCARE FOLLOWING SURGERY: Primary | ICD-10-CM

## 2019-10-18 PROCEDURE — 99213 OFFICE O/P EST LOW 20 MIN: CPT | Performed by: ORTHOPAEDIC SURGERY

## 2019-10-18 PROCEDURE — 73110 X-RAY EXAM OF WRIST: CPT

## 2019-10-18 NOTE — PROGRESS NOTES
ASSESSMENT/PLAN:    Assessment:   S/p RIGHT distal radius ORIF, 6/276/19    Plan:   Resume activities as tolerated    Follow Up:  PRN      _____________________________________________________  CHIEF COMPLAINT:  Chief Complaint   Patient presents with    Right Wrist - Follow-up         SUBJECTIVE  Tigre Tyson is a 77 y o  female who presents for an 8 week follow up regarding S/P R distal radius ORIF 06/27/19  Since last visit, Tigre Tyson has tried NSAIDs and Tylenol with relief    Today there is None to the right wrist     Radiation: None  Associated symptoms: No Complaints    PAST MEDICAL HISTORY:  Past Medical History:   Diagnosis Date    Hypertension     Kidney stone        PAST SURGICAL HISTORY:  Past Surgical History:   Procedure Laterality Date    APPENDECTOMY      CAST APPLICATION Right 6/82/9981    Procedure: Application short-arm splint;  Surgeon: Ping Hamlin MD;  Location:  MAIN OR;  Service: Orthopedics    COLONOSCOPY      EYE SURGERY      NM OPEN RX DISTAL RADIUS FX, INTRA-ARTICULAR, 3+ FRAG Right 6/27/2019    Procedure: Open reduction with internal fixation right distal radius fracture;  Surgeon: Ping Hamlin MD;  Location:  MAIN OR;  Service: Orthopedics    SHOULDER SURGERY      TUBAL LIGATION         FAMILY HISTORY:  Family History   Problem Relation Age of Onset    Diabetes Mother     Heart disease Father     No Known Problems Sister     No Known Problems Brother        SOCIAL HISTORY:  Social History     Tobacco Use    Smoking status: Never Smoker    Smokeless tobacco: Never Used   Substance Use Topics    Alcohol use: Not Currently     Frequency: Monthly or less     Drinks per session: 1 or 2     Binge frequency: Less than monthly     Comment: rarely    Drug use: Never       MEDICATIONS:    Current Outpatient Medications:     brimonidine-timolol (COMBIGAN) 0 2-0 5 %, Administer 1 drop to both eyes every 12 (twelve) hours, Disp: , Rfl:    cyclopentolate (CYCLOGYL) 0 5 % ophthalmic solution, Administer 1 drop to both eyes 2 (two) times a day, Disp: , Rfl:     cycloSPORINE (RESTASIS) 0 05 % ophthalmic emulsion, Administer 1 drop to both eyes every 12 (twelve) hours, Disp: , Rfl:     dorzolamide (TRUSOPT) 2 % ophthalmic solution, , Disp: , Rfl:     FLUoxetine (PROzac) 40 MG capsule, Take 40 mg by mouth daily, Disp: , Rfl:     latanoprost (XALATAN) 0 005 % ophthalmic solution, Administer 1 drop to both eyes daily at bedtime, Disp: , Rfl:     HYDROcodone-acetaminophen (NORCO) 5-325 mg per tablet, Take 1 tablet by mouth every 6 (six) hours as needed for pain for up to 20 dosesMax Daily Amount: 4 tablets, Disp: 20 tablet, Rfl: 0    naproxen sodium (ALEVE) 220 MG tablet, Take 1 tablet (220 mg total) by mouth 2 (two) times a day with meals for 5 days, Disp: 10 tablet, Rfl: 0    ALLERGIES:  Allergies   Allergen Reactions    Alendronate Rash    Penicillins Nausea Only     Other reaction(s): Unknown Reaction       REVIEW OF SYSTEMS:  Pertinent items are noted in HPI  A comprehensive review of systems was negative      LABS:  HgA1c: No results found for: HGBA1C  BMP: No results found for: GLUCOSE, CALCIUM, NA, K, CO2, CL, BUN, CREATININE        _____________________________________________________  PHYSICAL EXAMINATION:  Vital signs: /76   Pulse 63   Ht 5' 3" (1 6 m)   BMI 26 22 kg/m²   General: well developed and well nourished, alert, oriented times 3 and appears comfortable  Psychiatric: Normal  HEENT: Trachea Midline, No torticollis  Cardiovascular: No discernable arrhythmia  Pulmonary: No wheezing or stridor  Skin: No masses, erythema, lacerations, fluctation, ulcerations  Neurovascular: Sensation Intact to the Median, Ulnar, Radial Nerve, Motor Intact to the Median, Ulnar, Radial Nerve and Pulses Intact    MUSCULOSKELETAL EXAMINATION:  RIGHT SIDE:  Stable DRUJ, no swelling within the wrist, mild soreness over the incision, Full nonpainful supination and pronation, lacks 5 degrees terminal flexion and extension of the wrist      _____________________________________________________  STUDIES REVIEWED:  Images were reviewd in PACS: Xrays of the right wrist: no sign of DRUJ widening, hardware is in proper position, 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

## 2023-07-10 ENCOUNTER — APPOINTMENT (OUTPATIENT)
Age: 73
End: 2023-07-10
Payer: MEDICARE

## 2023-07-10 ENCOUNTER — TELEPHONE (OUTPATIENT)
Dept: OBGYN CLINIC | Facility: OTHER | Age: 73
End: 2023-07-10

## 2023-07-10 ENCOUNTER — OFFICE VISIT (OUTPATIENT)
Age: 73
End: 2023-07-10
Payer: MEDICARE

## 2023-07-10 VITALS
TEMPERATURE: 96.8 F | BODY MASS INDEX: 26.58 KG/M2 | DIASTOLIC BLOOD PRESSURE: 86 MMHG | SYSTOLIC BLOOD PRESSURE: 164 MMHG | HEART RATE: 74 BPM | HEIGHT: 63 IN | OXYGEN SATURATION: 97 % | WEIGHT: 150 LBS | RESPIRATION RATE: 18 BRPM

## 2023-07-10 DIAGNOSIS — S69.92XA LEFT WRIST INJURY, INITIAL ENCOUNTER: ICD-10-CM

## 2023-07-10 DIAGNOSIS — S52.611A TRAUMATIC CLOSED FRACTURE OF ULNAR STYLOID WITH MINIMAL DISPLACEMENT, RIGHT, INITIAL ENCOUNTER: ICD-10-CM

## 2023-07-10 DIAGNOSIS — S69.92XA LEFT WRIST INJURY, INITIAL ENCOUNTER: Primary | ICD-10-CM

## 2023-07-10 PROCEDURE — 99213 OFFICE O/P EST LOW 20 MIN: CPT | Performed by: EMERGENCY MEDICINE

## 2023-07-10 PROCEDURE — 73110 X-RAY EXAM OF WRIST: CPT

## 2023-07-10 PROCEDURE — G0463 HOSPITAL OUTPT CLINIC VISIT: HCPCS | Performed by: EMERGENCY MEDICINE

## 2023-07-10 RX ORDER — LISINOPRIL 10 MG/1
10 TABLET ORAL DAILY
COMMUNITY
Start: 2023-06-04

## 2023-07-10 RX ORDER — ATORVASTATIN CALCIUM 40 MG/1
TABLET, FILM COATED ORAL
COMMUNITY
Start: 2023-06-04

## 2023-07-10 NOTE — PATIENT INSTRUCTIONS
Wrist Fracture in Adults   WHAT YOU NEED TO KNOW:   A wrist fracture is a break in one or more of the bones in your wrist.        DISCHARGE INSTRUCTIONS:   Return to the emergency department if:   Your pain gets worse or does not get better after you take pain medicine. Your cast or splint breaks, gets wet, or is damaged. Your hand or fingers feel numb or cold. Your hand or fingers turn white or blue. Your splint or cast feels too tight. You have more pain or swelling after the cast or splint is put on. Call your doctor if:   You have a fever. There is a foul smell or blood coming from under the cast.    You have questions or concerns about your condition or care. Medicines: You may need any of the following:  Prescription pain medicine  may be given. Ask your healthcare provider how to take this medicine safely. Some prescription pain medicines contain acetaminophen. Do not take other medicines that contain acetaminophen without talking to your healthcare provider. Too much acetaminophen may cause liver damage. Prescription pain medicine may cause constipation. Ask your healthcare provider how to prevent or treat constipation. NSAIDs , such as ibuprofen, help decrease swelling, pain, and fever. NSAIDs can cause stomach bleeding or kidney problems in certain people. If you take blood thinner medicine, always ask your healthcare provider if NSAIDs are safe for you. Always read the medicine label and follow directions. Acetaminophen  decreases pain and fever. It is available without a doctor's order. Ask how much to take and how often to take it. Follow directions. Read the labels of all other medicines you are using to see if they also contain acetaminophen, or ask your doctor or pharmacist. Acetaminophen can cause liver damage if not taken correctly. Take your medicine as directed.   Contact your healthcare provider if you think your medicine is not helping or if you have side effects. Tell your provider if you are allergic to any medicine. Keep a list of the medicines, vitamins, and herbs you take. Include the amounts, and when and why you take them. Bring the list or the pill bottles to follow-up visits. Carry your medicine list with you in case of an emergency. Self-care:   Rest  as much as possible. Do not play contact sports until the healthcare provider says it is okay. Apply ice  on your wrist for 15 to 20 minutes every hour or as directed. Use an ice pack, or put crushed ice in a plastic bag. Cover it with a towel before you place it on your skin. Ice helps prevent tissue damage and decreases swelling and pain. Elevate  your wrist above the level of your heart as often as possible. This will help decrease swelling and pain. Prop your wrist on pillows or blankets to keep it elevated comfortably. Cast or splint care: You may take a bath or shower as directed. Do not let your cast or splint get wet. Before bathing, cover the cast or splint with 2 plastic trash bags. Tape the bags to your skin above the cast or splint to seal out the water. Keep your arm out of the water in case the bag breaks. If a plaster cast gets wet and soft, call your healthcare provider. Check the skin around the cast or splint every day. You may put lotion on any red or sore areas. Do not push down or lean on the cast or brace because it may break. Do not  scratch the skin under the cast by putting a sharp or pointed object inside the cast.    Go to physical therapy as directed: You may need physical therapy after your wrist heals and the cast is removed. A physical therapist can teach you exercises to help improve movement and strength and to decrease pain. Follow up with your doctor or bone specialist as directed: You may need to return to have your cast removed. You may also need an x-ray to check how well the bone has healed.  Write down your questions so you remember to ask them during your visits. © Copyright [employer] 2022 Information is for End User's use only and may not be sold, redistributed or otherwise used for commercial purposes. The above information is an  only. It is not intended as medical advice for individual conditions or treatments. Talk to your doctor, nurse or pharmacist before following any medical regimen to see if it is safe and effective for you.

## 2023-07-10 NOTE — PROGRESS NOTES
[facility]        NAME: Merry Pak is a 71 y.o. female  : 1950    MRN: [id number]  DATE: July 10, 2023  TIME: 9:41 AM    Assessment and Plan   Left wrist injury, initial encounter [S69.92XA]  1. Left wrist injury, initial encounter  XR wrist 3+ vw left    Ambulatory Referral to Orthopedic Surgery      2. Traumatic closed fracture of ulnar styloid with minimal displacement, right, initial encounter  Ambulatory Referral to Orthopedic Surgery        Static cock up wrist splint applied by RN. Patient Instructions     Patient Instructions     Wrist Fracture in Adults   WHAT YOU NEED TO KNOW:   A wrist fracture is a break in one or more of the bones in your wrist.        DISCHARGE INSTRUCTIONS:   Return to the emergency department if:   • Your pain gets worse or does not get better after you take pain medicine. • Your cast or splint breaks, gets wet, or is damaged. • Your hand or fingers feel numb or cold. • Your hand or fingers turn white or blue. • Your splint or cast feels too tight. • You have more pain or swelling after the cast or splint is put on. Call your doctor if:   • You have a fever. • There is a foul smell or blood coming from under the cast.    • You have questions or concerns about your condition or care. Medicines: You may need any of the following:  • Prescription pain medicine  may be given. Ask your healthcare provider how to take this medicine safely. Some prescription pain medicines contain acetaminophen. Do not take other medicines that contain acetaminophen without talking to your healthcare provider. Too much acetaminophen may cause liver damage. Prescription pain medicine may cause constipation. Ask your healthcare provider how to prevent or treat constipation. • NSAIDs , such as ibuprofen, help decrease swelling, pain, and fever. NSAIDs can cause stomach bleeding or kidney problems in certain people.  If you take blood thinner medicine, always ask your healthcare provider if NSAIDs are safe for you. Always read the medicine label and follow directions. • Acetaminophen  decreases pain and fever. It is available without a doctor's order. Ask how much to take and how often to take it. Follow directions. Read the labels of all other medicines you are using to see if they also contain acetaminophen, or ask your doctor or pharmacist. Acetaminophen can cause liver damage if not taken correctly. • Take your medicine as directed. Contact your healthcare provider if you think your medicine is not helping or if you have side effects. Tell your provider if you are allergic to any medicine. Keep a list of the medicines, vitamins, and herbs you take. Include the amounts, and when and why you take them. Bring the list or the pill bottles to follow-up visits. Carry your medicine list with you in case of an emergency. Self-care:   • Rest  as much as possible. Do not play contact sports until the healthcare provider says it is okay. • Apply ice  on your wrist for 15 to 20 minutes every hour or as directed. Use an ice pack, or put crushed ice in a plastic bag. Cover it with a towel before you place it on your skin. Ice helps prevent tissue damage and decreases swelling and pain. • Elevate  your wrist above the level of your heart as often as possible. This will help decrease swelling and pain. Prop your wrist on pillows or blankets to keep it elevated comfortably. Cast or splint care:   • You may take a bath or shower as directed. Do not let your cast or splint get wet. Before bathing, cover the cast or splint with 2 plastic trash bags. Tape the bags to your skin above the cast or splint to seal out the water. Keep your arm out of the water in case the bag breaks. If a plaster cast gets wet and soft, call your healthcare provider. • Check the skin around the cast or splint every day. You may put lotion on any red or sore areas.     • Do not push down or lean on the cast or brace because it may break. • Do not  scratch the skin under the cast by putting a sharp or pointed object inside the cast.    Go to physical therapy as directed: You may need physical therapy after your wrist heals and the cast is removed. A physical therapist can teach you exercises to help improve movement and strength and to decrease pain. Follow up with your doctor or bone specialist as directed: You may need to return to have your cast removed. You may also need an x-ray to check how well the bone has healed. Write down your questions so you remember to ask them during your visits. © Copyright [employer]ash Michael 2022 Information is for End User's use only and may not be sold, redistributed or otherwise used for commercial purposes. The above information is an  only. It is not intended as medical advice for individual conditions or treatments. Talk to your doctor, nurse or pharmacist before following any medical regimen to see if it is safe and effective for you. Follow up with PCP in 3-5 days. Proceed to  ER if symptoms worsen. Chief Complaint     Chief Complaint   Patient presents with   • Wrist Injury     Slipped while getting out of shower yesterday injuring left wrist   2 years ago broke same wrist and did not get recommended surgery          History of Present Illness       Patient complains of pain and swelling of the left wrist ulnar aspect since fall on outstretched hand when getting out of shower yesterday. Patient has history of prior wrist fracture for which she refused surgery. 2 years ago. Review of Systems   Review of Systems   Constitutional: Negative for activity change. Gastrointestinal: Negative for abdominal pain. Musculoskeletal: Positive for arthralgias and joint swelling. Negative for back pain, myalgias, neck pain and neck stiffness. Skin: Negative for color change and wound.    Neurological: Negative for dizziness, syncope, weakness, light-headedness and headaches.          Current Medications       Current Outpatient Medications:   •  FLUoxetine (PROzac) 40 MG capsule, Take 40 mg by mouth daily, Disp: , Rfl:   •  lisinopril (ZESTRIL) 10 mg tablet, Take 10 mg by mouth daily, Disp: , Rfl:   •  atorvastatin (LIPITOR) 40 mg tablet, TAKE 1 TABLET BY MOUTH EVERY DAY AT NIGHT, Disp: , Rfl:   •  brimonidine-timolol (COMBIGAN) 0.2-0.5 %, Administer 1 drop to both eyes every 12 (twelve) hours (Patient not taking: Reported on 7/10/2023), Disp: , Rfl:   •  cyclopentolate (CYCLOGYL) 0.5 % ophthalmic solution, Administer 1 drop to both eyes 2 (two) times a day (Patient not taking: Reported on 7/10/2023), Disp: , Rfl:   •  cycloSPORINE (RESTASIS) 0.05 % ophthalmic emulsion, Administer 1 drop to both eyes every 12 (twelve) hours (Patient not taking: Reported on 7/10/2023), Disp: , Rfl:   •  dorzolamide (TRUSOPT) 2 % ophthalmic solution, , Disp: , Rfl:   •  HYDROcodone-acetaminophen (NORCO) 5-325 mg per tablet, Take 1 tablet by mouth every 6 (six) hours as needed for pain for up to 20 dosesMax Daily Amount: 4 tablets (Patient not taking: Reported on 7/10/2023), Disp: 20 tablet, Rfl: 0  •  latanoprost (XALATAN) 0.005 % ophthalmic solution, Administer 1 drop to both eyes daily at bedtime (Patient not taking: Reported on 7/10/2023), Disp: , Rfl:   •  naproxen sodium (ALEVE) 220 MG tablet, Take 1 tablet (220 mg total) by mouth 2 (two) times a day with meals for 5 days, Disp: 10 tablet, Rfl: 0    Current Allergies     Allergies as of 07/10/2023 - Reviewed 07/10/2023   Allergen Reaction Noted   • Alendronate Rash 03/30/2017   • Penicillins Nausea Only 07/05/2013            The following portions of the patient's history were reviewed and updated as appropriate: allergies, current medications, past family history, past medical history, past social history, past surgical history and problem list.     Past Medical History:   Diagnosis Date   • Hypertension    • Kidney stone        Past Surgical History:   Procedure Laterality Date   • APPENDECTOMY     • CAST APPLICATION Right 4/48/0925    Procedure: Application short-arm splint;  Surgeon: Estefania Knott MD;  Location: [facility];  Service: Orthopedics   • COLONOSCOPY     • EYE SURGERY     • VT OPTX DSTL RADL I-ARTIC FX/EPIPHYSL SEP 3 FRAG Right 6/27/2019    Procedure: Open reduction with internal fixation right distal radius fracture;  Surgeon: Estefania Knott MD;  Location: [facility];  Service: Orthopedics   • SHOULDER SURGERY     • TUBAL LIGATION         Family History   Problem Relation Age of Onset   • Diabetes Mother    • Heart disease Father    • No Known Problems Sister    • No Known Problems Brother          Medications have been verified. Objective   /86   Pulse 74   Temp (!) 96.8 °F (36 °C)   Resp 18   Ht 5' 3" (1.6 m)   Wt 68 kg (150 lb)   SpO2 97%   BMI 26.57 kg/m²        Physical Exam     Physical Exam  Vitals and nursing note reviewed. Constitutional:       Appearance: She is well-developed. HENT:      Head: Normocephalic and atraumatic. Eyes:      Conjunctiva/sclera: Conjunctivae normal.      Pupils: Pupils are equal, round, and reactive to light. Musculoskeletal:         General: Tenderness present. Cervical back: Normal range of motion and neck supple. Comments: Tender and swollen left wrist ulnar aspect   Skin:     General: Skin is warm and dry. Findings: No rash. Neurological:      Mental Status: She is alert and oriented to person, place, and time. Deep Tendon Reflexes: Reflexes normal.   Psychiatric:         Mood and Affect: Mood normal.         Behavior: Behavior normal.         Thought Content:  Thought content normal.         Judgment: Judgment normal.

## 2023-07-10 NOTE — TELEPHONE ENCOUNTER
Hello!!    I have a patient that is in need of a HAND appointment    Patient is  Merry Pak   : [birth year]  MRN : [id number]  C/b # [phone]  Reason for Appointment : Wrist/Hand injury  Requested Doctor & Location : hand, tbd     Thank you!

## 2024-01-28 ENCOUNTER — OFFICE VISIT (OUTPATIENT)
Age: 74
End: 2024-01-28
Payer: COMMERCIAL

## 2024-01-28 VITALS
WEIGHT: 147.49 LBS | SYSTOLIC BLOOD PRESSURE: 136 MMHG | OXYGEN SATURATION: 97 % | HEART RATE: 89 BPM | DIASTOLIC BLOOD PRESSURE: 90 MMHG | RESPIRATION RATE: 18 BRPM | BODY MASS INDEX: 26.13 KG/M2 | HEIGHT: 63 IN | TEMPERATURE: 96.7 F

## 2024-01-28 DIAGNOSIS — J01.90 ACUTE BACTERIAL SINUSITIS: Primary | ICD-10-CM

## 2024-01-28 DIAGNOSIS — B96.89 ACUTE BACTERIAL SINUSITIS: Primary | ICD-10-CM

## 2024-01-28 PROCEDURE — 99213 OFFICE O/P EST LOW 20 MIN: CPT

## 2024-01-28 RX ORDER — DOXYCYCLINE 100 MG/1
100 TABLET ORAL 2 TIMES DAILY
Qty: 14 TABLET | Refills: 0 | Status: SHIPPED | OUTPATIENT
Start: 2024-01-28 | End: 2024-02-04

## 2024-01-28 RX ORDER — BENZONATATE 100 MG/1
100 CAPSULE ORAL 3 TIMES DAILY PRN
Qty: 20 CAPSULE | Refills: 0 | Status: SHIPPED | OUTPATIENT
Start: 2024-01-28

## 2024-01-28 RX ORDER — FLUTICASONE PROPIONATE 50 MCG
1 SPRAY, SUSPENSION (ML) NASAL DAILY
Qty: 15.8 ML | Refills: 0 | Status: SHIPPED | OUTPATIENT
Start: 2024-01-28

## 2024-01-28 NOTE — PATIENT INSTRUCTIONS
Take antibiotics as prescribed  For decongestion, Over The Counter medications:  Continue Coricidin HBP (Safe for when you have high blood pressure)  Nasal corticosteroid: such as Flonase - I have sent a prescription, sometimes it is cheaper to purchase the over the counter equivalent.  Nasal saline irrigation  Humidified air  Warm moist air such as running the shower for several minutes on warm/hot for the steam. Sit in bathroom for several minutes to take deep breaths.  Vicks Vapor Rub  For Cough or sore throat:  Salt water gurgle  Honey  Chloraseptic spray  Throat lozenges  Over the Counter Tylenol or Ibuprofen  Tessalon that was prescribed for cough as needed as prescribed.    Follow up with Primary Care Provider in 3-5 days if not improving.  Proceed to ER if symptoms worsen.

## 2024-01-28 NOTE — PROGRESS NOTES
[facility]        NAME: Lew Shipley is a 73 y.o. female  : 1950    MRN: [id number]  DATE: 2024  TIME: 1:37 PM    Assessment and Plan   Acute bacterial sinusitis [J01.90, B96.89]  1. Acute bacterial sinusitis  doxycycline (ADOXA) 100 MG tablet    benzonatate (TESSALON PERLES) 100 mg capsule    fluticasone (FLONASE) 50 mcg/act nasal spray            Patient Instructions   Take antibiotics as prescribed  For decongestion, Over The Counter medications:  Continue Coricidin HBP (Safe for when you have high blood pressure)  Nasal corticosteroid: such as Flonase - I have sent a prescription, sometimes it is cheaper to purchase the over the counter equivalent.  Nasal saline irrigation  Humidified air  Warm moist air such as running the shower for several minutes on warm/hot for the steam. Sit in bathroom for several minutes to take deep breaths.  Vicks Vapor Rub  For Cough or sore throat:  Salt water gurgle  Honey  Chloraseptic spray  Throat lozenges  Over the Counter Tylenol or Ibuprofen  Tessalon that was prescribed for cough as needed as prescribed.    Follow up with Primary Care Provider in 3-5 days if not improving.  Proceed to ER if symptoms worsen.    Chief Complaint     Chief Complaint   Patient presents with   • Cough     For three weeks. Taking Corcidan was helping. Sore throat when she coughs a lot.          History of Present Illness       Cough and sinus congestion about 3 weeks ago, was feeling better after about 1.5 weeks and then gotten worse. States she has been using Coricidin HBP. States sinus congestion is worse at night and the cough is causing her throat to be sore. No sick contacts she is aware of.        Review of Systems   Review of Systems   Constitutional:  Negative for chills and fever.   HENT:  Positive for congestion, postnasal drip, rhinorrhea and sore throat. Negative for ear pain.    Eyes:  Negative for pain and visual disturbance.   Respiratory:  Positive  for cough. Negative for shortness of breath.    Cardiovascular:  Negative for chest pain and palpitations.   Gastrointestinal:  Negative for abdominal pain and vomiting.   Genitourinary:  Negative for dysuria and hematuria.   Musculoskeletal:  Negative for arthralgias and back pain.   Skin:  Negative for color change and rash.   Neurological:  Negative for dizziness, seizures, syncope, weakness and numbness.   All other systems reviewed and are negative.        Current Medications       Current Outpatient Medications:   •  atorvastatin (LIPITOR) 40 mg tablet, TAKE 1 TABLET BY MOUTH EVERY DAY AT NIGHT, Disp: , Rfl:   •  benzonatate (TESSALON PERLES) 100 mg capsule, Take 1 capsule (100 mg total) by mouth 3 (three) times a day as needed for cough, Disp: 20 capsule, Rfl: 0  •  doxycycline (ADOXA) 100 MG tablet, Take 1 tablet (100 mg total) by mouth 2 (two) times a day for 7 days, Disp: 14 tablet, Rfl: 0  •  FLUoxetine (PROzac) 40 MG capsule, Take 40 mg by mouth daily, Disp: , Rfl:   •  fluticasone (FLONASE) 50 mcg/act nasal spray, 1 spray into each nostril daily, Disp: 15.8 mL, Rfl: 0  •  lisinopril (ZESTRIL) 10 mg tablet, Take 10 mg by mouth daily, Disp: , Rfl:   •  brimonidine-timolol (COMBIGAN) 0.2-0.5 %, Administer 1 drop to both eyes every 12 (twelve) hours (Patient not taking: Reported on 7/10/2023), Disp: , Rfl:   •  cyclopentolate (CYCLOGYL) 0.5 % ophthalmic solution, Administer 1 drop to both eyes 2 (two) times a day (Patient not taking: Reported on 7/10/2023), Disp: , Rfl:   •  cycloSPORINE (RESTASIS) 0.05 % ophthalmic emulsion, Administer 1 drop to both eyes every 12 (twelve) hours (Patient not taking: Reported on 7/10/2023), Disp: , Rfl:   •  dorzolamide (TRUSOPT) 2 % ophthalmic solution, , Disp: , Rfl:   •  HYDROcodone-acetaminophen (NORCO) 5-325 mg per tablet, Take 1 tablet by mouth every 6 (six) hours as needed for pain for up to 20 dosesMax Daily Amount: 4 tablets (Patient not taking: Reported on  "7/10/2023), Disp: 20 tablet, Rfl: 0  •  latanoprost (XALATAN) 0.005 % ophthalmic solution, Administer 1 drop to both eyes daily at bedtime (Patient not taking: Reported on 7/10/2023), Disp: , Rfl:   •  naproxen sodium (ALEVE) 220 MG tablet, Take 1 tablet (220 mg total) by mouth 2 (two) times a day with meals for 5 days, Disp: 10 tablet, Rfl: 0    Current Allergies     Allergies as of 01/28/2024 - Reviewed 01/28/2024   Allergen Reaction Noted   • Alendronate Rash 03/30/2017   • Penicillins Nausea Only 07/05/2013            The following portions of the patient's history were reviewed and updated as appropriate: allergies, current medications, past family history, past medical history, past social history, past surgical history and problem list.     Past Medical History:   Diagnosis Date   • Hypertension    • Kidney stone    • Wrist fracture 2023    left       Past Surgical History:   Procedure Laterality Date   • APPENDECTOMY     • CAST APPLICATION Right 6/27/2019    Procedure: Application short-arm splint;  Surgeon: Poncho Jensen MD;  Location:  MAIN OR;  Service: Orthopedics   • COLONOSCOPY     • EYE SURGERY     • AL OPTX DSTL RADL I-ARTIC FX/EPIPHYSL SEP 3 FRAG Right 6/27/2019    Procedure: Open reduction with internal fixation right distal radius fracture;  Surgeon: Poncho Jensen MD;  Location:  MAIN OR;  Service: Orthopedics   • SHOULDER SURGERY     • TUBAL LIGATION         Family History   Problem Relation Age of Onset   • Diabetes Mother    • Heart disease Father    • No Known Problems Sister    • No Known Problems Brother          Medications have been verified.        Objective   /90   Pulse 89   Temp (!) 96.7 °F (35.9 °C)   Resp 18   Ht 5' 3\" (1.6 m)   Wt 66.9 kg (147 lb 7.8 oz)   SpO2 97%   BMI 26.13 kg/m²   No LMP recorded. Patient is postmenopausal.       Physical Exam     Physical Exam  Vitals and nursing note reviewed.   Constitutional:       Appearance: Normal appearance. "   HENT:      Head: Normocephalic and atraumatic.      Right Ear: Tympanic membrane normal.      Left Ear: Tympanic membrane normal.      Nose: Congestion and rhinorrhea present.      Right Sinus: Maxillary sinus tenderness present.      Left Sinus: Maxillary sinus tenderness present.      Mouth/Throat:      Mouth: Mucous membranes are moist.   Eyes:      Conjunctiva/sclera: Conjunctivae normal.      Pupils: Pupils are equal, round, and reactive to light.   Cardiovascular:      Rate and Rhythm: Normal rate.      Pulses: Normal pulses.      Heart sounds: Normal heart sounds.   Pulmonary:      Effort: Pulmonary effort is normal.      Breath sounds: Normal breath sounds. No wheezing, rhonchi or rales.   Skin:     General: Skin is warm and dry.      Capillary Refill: Capillary refill takes less than 2 seconds.   Neurological:      General: No focal deficit present.      Mental Status: She is alert and oriented to person, place, and time. Mental status is at baseline.      Sensory: No sensory deficit.      Motor: No weakness.   Psychiatric:         Mood and Affect: Mood normal.         Behavior: Behavior normal.         Thought Content: Thought content normal.

## 2025-08-06 ENCOUNTER — OFFICE VISIT (OUTPATIENT)
Age: 75
End: 2025-08-06
Payer: COMMERCIAL

## 2025-08-06 VITALS
HEART RATE: 105 BPM | RESPIRATION RATE: 18 BRPM | OXYGEN SATURATION: 98 % | TEMPERATURE: 96.8 F | DIASTOLIC BLOOD PRESSURE: 74 MMHG | WEIGHT: 143.3 LBS | BODY MASS INDEX: 25.39 KG/M2 | SYSTOLIC BLOOD PRESSURE: 126 MMHG | HEIGHT: 63 IN

## 2025-08-06 DIAGNOSIS — J32.9 SINOBRONCHITIS: Primary | ICD-10-CM

## 2025-08-06 DIAGNOSIS — J40 SINOBRONCHITIS: Primary | ICD-10-CM

## 2025-08-06 PROBLEM — E78.49 OTHER HYPERLIPIDEMIA: Status: ACTIVE | Noted: 2020-06-18

## 2025-08-06 PROBLEM — N18.31 STAGE 3A CHRONIC KIDNEY DISEASE (HCC): Status: ACTIVE | Noted: 2024-04-04

## 2025-08-06 PROCEDURE — 99213 OFFICE O/P EST LOW 20 MIN: CPT

## 2025-08-06 RX ORDER — DOXYCYCLINE 100 MG/1
100 TABLET ORAL 2 TIMES DAILY
Qty: 14 TABLET | Refills: 0 | Status: SHIPPED | OUTPATIENT
Start: 2025-08-06 | End: 2025-08-13

## 2025-08-06 RX ORDER — BENZONATATE 200 MG/1
200 CAPSULE ORAL 3 TIMES DAILY PRN
Qty: 20 CAPSULE | Refills: 0 | Status: SHIPPED | OUTPATIENT
Start: 2025-08-06

## 2025-08-06 RX ORDER — BUPROPION HYDROCHLORIDE 150 MG/1
150 TABLET ORAL EVERY MORNING
COMMUNITY
Start: 2025-06-11 | End: 2026-06-11

## 2025-08-06 RX ORDER — PREDNISONE 20 MG/1
20 TABLET ORAL 2 TIMES DAILY
Qty: 10 TABLET | Refills: 0 | Status: SHIPPED | OUTPATIENT
Start: 2025-08-06 | End: 2025-08-11

## (undated) DEVICE — GLOVE INDICATOR PI UNDERGLOVE SZ 8 BLUE

## (undated) DEVICE — DRAPE C-ARM X-RAY

## (undated) DEVICE — DRILL TWIST 2.3MM

## (undated) DEVICE — STERILE BETHLEHEM PLASTIC HAND: Brand: CARDINAL HEALTH

## (undated) DEVICE — INTENDED FOR TISSUE SEPARATION, AND OTHER PROCEDURES THAT REQUIRE A SHARP SURGICAL BLADE TO PUNCTURE OR CUT.: Brand: BARD-PARKER SAFETY BLADES SIZE 15, STERILE

## (undated) DEVICE — GLOVE SRG BIOGEL 7.5

## (undated) DEVICE — GAUZE SPONGES,16 PLY: Brand: CURITY

## (undated) DEVICE — SPONGE PVP SCRUB WING STERILE

## (undated) DEVICE — ACE WRAP 4 IN STERILE

## (undated) DEVICE — CHLORAPREP HI-LITE 26ML ORANGE

## (undated) DEVICE — KERLIX BANDAGE ROLL: Brand: KERLIX

## (undated) DEVICE — SUT VICRYL 3-0 SH 27 IN J416H

## (undated) DEVICE — OCCLUSIVE GAUZE STRIP,3% BISMUTH TRIBROMOPHENATE IN PETROLATUM BLEND: Brand: XEROFORM

## (undated) DEVICE — SCREW CORTICAL 3.2 X 10MM
Type: IMPLANTABLE DEVICE | Site: WRIST | Status: NON-FUNCTIONAL
Removed: 2019-06-27

## (undated) DEVICE — SCREW CORTICAL 2.3 X 20MM
Type: IMPLANTABLE DEVICE | Site: WRIST | Status: NON-FUNCTIONAL
Removed: 2019-06-27

## (undated) DEVICE — DRILL 1.8 X 90MM AKA

## (undated) DEVICE — TRIMED MINI-GUIDE

## (undated) DEVICE — PADDING CAST 4 IN  COTTON STRL